# Patient Record
Sex: FEMALE | Race: WHITE | NOT HISPANIC OR LATINO | Employment: OTHER | URBAN - METROPOLITAN AREA
[De-identification: names, ages, dates, MRNs, and addresses within clinical notes are randomized per-mention and may not be internally consistent; named-entity substitution may affect disease eponyms.]

---

## 2023-02-19 PROBLEM — H93.11 TINNITUS OF RIGHT EAR: Status: ACTIVE | Noted: 2023-02-19

## 2023-02-19 PROBLEM — R09.82 POSTNASAL DRIP: Status: ACTIVE | Noted: 2023-02-19

## 2023-02-19 PROBLEM — J45.909 ASTHMA (HHS-HCC): Status: ACTIVE | Noted: 2023-02-19

## 2023-02-19 PROBLEM — E55.9 VITAMIN D DEFICIENCY: Status: ACTIVE | Noted: 2023-02-19

## 2023-02-19 PROBLEM — R53.83 OTHER FATIGUE: Status: ACTIVE | Noted: 2023-02-19

## 2023-02-19 PROBLEM — H69.91 DYSFUNCTION OF RIGHT EUSTACHIAN TUBE: Status: ACTIVE | Noted: 2023-02-19

## 2023-02-19 PROBLEM — M99.08 SOMATIC DYSFUNCTION OF RIB: Status: ACTIVE | Noted: 2023-02-19

## 2023-02-19 PROBLEM — M54.9 BACK PAIN: Status: ACTIVE | Noted: 2023-02-19

## 2023-02-19 PROBLEM — B85.0 HEAD LICE INFESTATION: Status: ACTIVE | Noted: 2023-02-19

## 2023-02-19 PROBLEM — H92.01 OTALGIA OF RIGHT EAR: Status: ACTIVE | Noted: 2023-02-19

## 2023-02-19 PROBLEM — H90.A31 MIXED CONDUCTIVE AND SENSORINEURAL HEARING LOSS, UNILATERAL, RIGHT EAR WITH RESTRICTED HEARING ON THE CONTRALATERAL SIDE: Status: ACTIVE | Noted: 2023-02-19

## 2023-02-19 PROBLEM — J01.90 ACUTE SINUSITIS: Status: ACTIVE | Noted: 2023-02-19

## 2023-02-19 PROBLEM — E78.5 HYPERLIPIDEMIA: Status: ACTIVE | Noted: 2023-02-19

## 2023-02-19 PROBLEM — H90.3 SENSORINEURAL HEARING LOSS OF BOTH EARS: Status: ACTIVE | Noted: 2023-02-19

## 2023-02-19 PROBLEM — R05.9 COUGH: Status: ACTIVE | Noted: 2023-02-19

## 2023-02-19 PROBLEM — J20.9 ACUTE BRONCHITIS: Status: ACTIVE | Noted: 2023-02-19

## 2023-02-19 PROBLEM — M54.6 THORACIC BACK PAIN: Status: ACTIVE | Noted: 2023-02-19

## 2023-02-19 PROBLEM — J06.9 VIRAL URI WITH COUGH: Status: ACTIVE | Noted: 2023-02-19

## 2023-02-19 PROBLEM — E66.3 OVERWEIGHT WITH BODY MASS INDEX (BMI) OF 29 TO 29.9 IN ADULT: Status: ACTIVE | Noted: 2023-02-19

## 2023-02-19 PROBLEM — J45.901 ACUTE ASTHMA EXACERBATION (HHS-HCC): Status: ACTIVE | Noted: 2023-02-19

## 2023-02-19 PROBLEM — J30.2 SEASONAL ALLERGIES: Status: ACTIVE | Noted: 2023-02-19

## 2023-02-19 PROBLEM — R91.1 NODULE OF LEFT LUNG: Status: ACTIVE | Noted: 2023-02-19

## 2023-02-19 PROBLEM — J30.2 SEASONAL ALLERGIC RHINITIS: Status: ACTIVE | Noted: 2023-02-19

## 2023-02-19 PROBLEM — M81.0 OSTEOPOROSIS, SENILE: Status: ACTIVE | Noted: 2023-02-19

## 2023-02-19 PROBLEM — R49.0 HOARSENESS OF VOICE: Status: ACTIVE | Noted: 2023-02-19

## 2023-02-19 PROBLEM — R06.00 DYSPNEA: Status: ACTIVE | Noted: 2023-02-19

## 2023-02-19 PROBLEM — M99.02 SEGMENTAL AND SOMATIC DYSFUNCTION OF THORACIC REGION: Status: ACTIVE | Noted: 2023-02-19

## 2023-02-19 PROBLEM — H91.90 HEARING IMPAIRMENT: Status: ACTIVE | Noted: 2023-02-19

## 2023-02-19 RX ORDER — IBUPROFEN 600 MG/1
600 TABLET ORAL 3 TIMES DAILY
COMMUNITY
End: 2023-06-22 | Stop reason: ALTCHOICE

## 2023-02-19 RX ORDER — LYSINE HCL 500 MG
1 TABLET ORAL 2 TIMES DAILY
COMMUNITY
Start: 2017-06-07

## 2023-02-19 RX ORDER — ALBUTEROL SULFATE 1.25 MG/3ML
1.25 SOLUTION RESPIRATORY (INHALATION)
COMMUNITY
Start: 2018-07-05 | End: 2023-03-13 | Stop reason: SDUPTHER

## 2023-02-19 RX ORDER — CYCLOBENZAPRINE HCL 5 MG
5 TABLET ORAL NIGHTLY
COMMUNITY
End: 2023-06-22 | Stop reason: ALTCHOICE

## 2023-02-19 RX ORDER — CHOLECALCIFEROL (VITAMIN D3) 1250 MCG
50000 TABLET ORAL
COMMUNITY
Start: 2017-06-07 | End: 2023-03-13 | Stop reason: ALTCHOICE

## 2023-03-13 ENCOUNTER — OFFICE VISIT (OUTPATIENT)
Dept: PRIMARY CARE | Facility: CLINIC | Age: 73
End: 2023-03-13
Payer: MEDICARE

## 2023-03-13 VITALS
HEIGHT: 63 IN | BODY MASS INDEX: 31.36 KG/M2 | HEART RATE: 99 BPM | SYSTOLIC BLOOD PRESSURE: 117 MMHG | OXYGEN SATURATION: 94 % | DIASTOLIC BLOOD PRESSURE: 76 MMHG | WEIGHT: 177 LBS

## 2023-03-13 DIAGNOSIS — J44.9 CHRONIC OBSTRUCTIVE PULMONARY DISEASE, UNSPECIFIED COPD TYPE (MULTI): ICD-10-CM

## 2023-03-13 DIAGNOSIS — J45.909 ASTHMA, UNSPECIFIED ASTHMA SEVERITY, UNSPECIFIED WHETHER COMPLICATED, UNSPECIFIED WHETHER PERSISTENT (HHS-HCC): ICD-10-CM

## 2023-03-13 DIAGNOSIS — Z09 HOSPITAL DISCHARGE FOLLOW-UP: Primary | ICD-10-CM

## 2023-03-13 DIAGNOSIS — R06.09 DYSPNEA ON EXERTION: ICD-10-CM

## 2023-03-13 DIAGNOSIS — R05.9 COUGH, UNSPECIFIED TYPE: ICD-10-CM

## 2023-03-13 PROCEDURE — 1036F TOBACCO NON-USER: CPT | Performed by: FAMILY MEDICINE

## 2023-03-13 PROCEDURE — 1159F MED LIST DOCD IN RCRD: CPT | Performed by: FAMILY MEDICINE

## 2023-03-13 PROCEDURE — 99214 OFFICE O/P EST MOD 30 MIN: CPT | Performed by: FAMILY MEDICINE

## 2023-03-13 RX ORDER — FLUTICASONE PROPIONATE AND SALMETEROL 250; 50 UG/1; UG/1
POWDER RESPIRATORY (INHALATION)
Qty: 60 EACH | Refills: 11 | Status: SHIPPED | OUTPATIENT
Start: 2023-03-13 | End: 2023-04-20 | Stop reason: SINTOL

## 2023-03-13 RX ORDER — FLUTICASONE PROPIONATE AND SALMETEROL 100; 50 UG/1; UG/1
1 POWDER RESPIRATORY (INHALATION) 2 TIMES DAILY
COMMUNITY
End: 2023-03-13

## 2023-03-13 RX ORDER — GUAIFENESIN 600 MG/1
TABLET, EXTENDED RELEASE ORAL EVERY 12 HOURS PRN
COMMUNITY
End: 2023-06-22 | Stop reason: ALTCHOICE

## 2023-03-13 RX ORDER — ACETAMINOPHEN 500 MG
TABLET ORAL DAILY
COMMUNITY

## 2023-03-13 RX ORDER — ALBUTEROL SULFATE 0.83 MG/ML
3 SOLUTION RESPIRATORY (INHALATION) 4 TIMES DAILY
COMMUNITY
Start: 2023-02-08 | End: 2023-06-22 | Stop reason: SDUPTHER

## 2023-03-13 RX ORDER — MONTELUKAST SODIUM 10 MG/1
10 TABLET ORAL NIGHTLY
COMMUNITY
Start: 2023-02-08 | End: 2023-03-13 | Stop reason: SDUPTHER

## 2023-03-13 RX ORDER — MONTELUKAST SODIUM 10 MG/1
10 TABLET ORAL NIGHTLY
Qty: 90 TABLET | Refills: 2 | Status: SHIPPED | OUTPATIENT
Start: 2023-03-13 | End: 2023-03-14 | Stop reason: SDUPTHER

## 2023-03-13 ASSESSMENT — PATIENT HEALTH QUESTIONNAIRE - PHQ9
2. FEELING DOWN, DEPRESSED OR HOPELESS: NOT AT ALL
1. LITTLE INTEREST OR PLEASURE IN DOING THINGS: NOT AT ALL
SUM OF ALL RESPONSES TO PHQ9 QUESTIONS 1 & 2: 0

## 2023-03-13 ASSESSMENT — ENCOUNTER SYMPTOMS
WHEEZING: 1
ORTHOPNEA: 1
COUGH: 1
SHORTNESS OF BREATH: 1
SPUTUM PRODUCTION: 1

## 2023-03-13 NOTE — PATIENT INSTRUCTIONS
Increase nebulizer to inhale three times daily. Please call if you need refills  Start higher dose of Advair.   You may continue mucinex  Complete Pulmonary Function tests after you begin to feel better  See Pulmonologist

## 2023-03-13 NOTE — PROGRESS NOTES
"Subjective   Patient ID: Margaret Argueta is a 72 y.o. female who presents for Hospital Follow-up (pneumonia).    Shortness of Breath  This is a recurrent problem. The current episode started more than 1 month ago. The problem occurs daily. The problem has been waxing and waning. Associated symptoms include orthopnea, sputum production and wheezing. The symptoms are aggravated by lying flat and any activity. Associated symptoms comments: Yellow sputum  . She has tried steroid inhalers, oral steroids, OTC cough suppressants and body position changes for the symptoms. The treatment provided mild relief. Her past medical history is significant for asthma and pneumonia.        Review of Systems   Respiratory:  Positive for cough, sputum production, shortness of breath and wheezing.         Wheezing while laying flat   Cardiovascular:  Positive for orthopnea.   All other systems reviewed and are negative.      Objective   /76   Pulse 99   Ht 1.6 m (5' 3\")   Wt 80.3 kg (177 lb)   SpO2 94%   BMI 31.35 kg/m²     Physical Exam  Vitals reviewed.   Constitutional:       Appearance: Normal appearance.   HENT:      Head: Normocephalic.      Right Ear: Tympanic membrane, ear canal and external ear normal.      Left Ear: Tympanic membrane, ear canal and external ear normal.      Nose: Nose normal.      Mouth/Throat:      Mouth: Mucous membranes are moist.      Pharynx: Oropharynx is clear.   Eyes:      Conjunctiva/sclera: Conjunctivae normal.   Cardiovascular:      Rate and Rhythm: Normal rate and regular rhythm.      Pulses: Normal pulses.   Pulmonary:      Breath sounds: Rales present.      Comments: Coarse rales throughout  Skin:     General: Skin is dry.   Neurological:      General: No focal deficit present.      Mental Status: She is alert and oriented to person, place, and time.           Assessment/Plan   Diagnoses and all orders for this visit:  Hospital discharge follow-up  Chronic obstructive pulmonary " disease, unspecified COPD type (CMS/[facility])  -     fluticasone propion-salmeteroL (Advair Diskus) 250-50 mcg/dose diskus inhaler; Inhalel 1 puff twice a day.  -     Increase albuterol nebulizer to four times daily. Will switch to duoneb if ineffective   -     Referral to Pulmonology; Future  -     Pulmonary function test; Future  Asthma, unspecified asthma severity, unspecified whether complicated, unspecified whether persistent  -     montelukast (Singulair) 10 mg tablet; Take 1 tablet (10 mg) by mouth once daily at bedtime.  -     fluticasone propion-salmeteroL (Advair Diskus) 250-50 mcg/dose diskus inhaler; Inhalel 1 puff twice a day. Rinse mouth with water after use to reduce aftertaste and incidence of candidiasis. Do not swallow.  -     Referral to Pulmonology; Future  -     Pulmonary function test; Future  Dyspnea on exertion  Cough, unspecified type  -continue mucinex     Signed by DISHA Peterson student

## 2023-03-14 ENCOUNTER — TELEPHONE (OUTPATIENT)
Dept: PRIMARY CARE | Facility: CLINIC | Age: 73
End: 2023-03-14
Payer: MEDICARE

## 2023-03-14 DIAGNOSIS — J45.909 ASTHMA, UNSPECIFIED ASTHMA SEVERITY, UNSPECIFIED WHETHER COMPLICATED, UNSPECIFIED WHETHER PERSISTENT (HHS-HCC): ICD-10-CM

## 2023-03-14 RX ORDER — MONTELUKAST SODIUM 10 MG/1
10 TABLET ORAL NIGHTLY
Qty: 90 TABLET | Refills: 2 | Status: SHIPPED | OUTPATIENT
Start: 2023-03-14 | End: 2023-03-17 | Stop reason: SDUPTHER

## 2023-03-14 NOTE — TELEPHONE ENCOUNTER
[facility] is calling in regards to the Prescription you sent over yesterday for Montelukast 10 MG. They said you sent over the generic version and put a LUIS on it they wanted to see if this was a mistake. She said you put do not substitute on the medication wanted to make sure that this wasn't a mistake.

## 2023-03-14 NOTE — PROGRESS NOTES
Attestation signed by Nathen Shields DO at 3/14/2023  1:24 PM     I saw and evaluated the patient, participating in the key portions of the service.  I reviewed the resident’s note.  I agree with the Nurse practitioner student's findings and plan.

## 2023-03-17 RX ORDER — MONTELUKAST SODIUM 10 MG/1
10 TABLET ORAL NIGHTLY
Qty: 90 TABLET | Refills: 2 | Status: SHIPPED | OUTPATIENT
Start: 2023-03-17 | End: 2023-06-22 | Stop reason: SDUPTHER

## 2023-04-14 ENCOUNTER — TELEPHONE (OUTPATIENT)
Dept: PRIMARY CARE | Facility: CLINIC | Age: 73
End: 2023-04-14
Payer: MEDICARE

## 2023-04-14 DIAGNOSIS — J45.40 MODERATE PERSISTENT ASTHMA WITHOUT COMPLICATION (HHS-HCC): Primary | ICD-10-CM

## 2023-04-14 NOTE — TELEPHONE ENCOUNTER
Pt is vinnie she states that she had her pulmonary function test last week and the tech who preformed the test, told her that she should ask you to change her advair inhaler to symbicort inhaler, since the advair is causing her voice to be raspy. Pt uses [phone]

## 2023-04-18 ENCOUNTER — HOSPITAL ENCOUNTER (OUTPATIENT)
Dept: DATA CONVERSION | Facility: HOSPITAL | Age: 73
End: 2023-04-18
Attending: ORTHOPAEDIC SURGERY | Admitting: ORTHOPAEDIC SURGERY
Payer: MEDICARE

## 2023-04-18 DIAGNOSIS — S61.356A: ICD-10-CM

## 2023-04-18 DIAGNOSIS — J45.909 UNSPECIFIED ASTHMA, UNCOMPLICATED (HHS-HCC): ICD-10-CM

## 2023-04-18 DIAGNOSIS — Z90.49 ACQUIRED ABSENCE OF OTHER SPECIFIED PARTS OF DIGESTIVE TRACT: ICD-10-CM

## 2023-04-18 DIAGNOSIS — Z87.891 PERSONAL HISTORY OF NICOTINE DEPENDENCE: ICD-10-CM

## 2023-04-18 DIAGNOSIS — Z88.0 ALLERGY STATUS TO PENICILLIN: ICD-10-CM
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
## 2023-04-20 RX ORDER — BUDESONIDE AND FORMOTEROL FUMARATE DIHYDRATE 160; 4.5 UG/1; UG/1
2 AEROSOL RESPIRATORY (INHALATION)
Qty: 1 EACH | Refills: 2 | Status: SHIPPED | OUTPATIENT
Start: 2023-04-20 | End: 2023-06-22

## 2023-06-12 ENCOUNTER — DOCUMENTATION (OUTPATIENT)
Dept: PRIMARY CARE | Facility: CLINIC | Age: 73
End: 2023-06-12
Payer: MEDICARE

## 2023-06-12 ENCOUNTER — PATIENT OUTREACH (OUTPATIENT)
Dept: CARE COORDINATION | Facility: CLINIC | Age: 73
End: 2023-06-12
Payer: MEDICARE

## 2023-06-12 NOTE — PROGRESS NOTES
Discharge Facility:  [facility]   Discharge Diagnosis: Acute hypoxic respiratory failure, asthma exacerbation   Admission Date:  6/7/23   Discharge Date: 6/10/23     PCP Appointment Date:  6/22/23   Specialist Appointment Date:   Hospital Encounter and Summary:  LINK     2 ATTEMPTS TO REACH PT WITH NO RETURN CALL     START taking these medications   benzonatate (TESSALON PERLE) 100 mg Take 100 mg by mouth three times daily.   DULERA 1 Puff Inhale 1 Puff as instructed twice daily.   fluticasone (FLONASE) 1 Spray Use 1 Spray in each nostril once daily.   CONTINUE these medications which have CHANGED   predniSONE (DELTASONE) 20 mg tablet Take 2 tablets by mouth once daily for 3 days, THEN 1.5 tablets once daily for 3 days, THEN 1 tablet once daily for 3 days, THEN 0.5 tablets once daily for 3 days. Qty: 15 tablet Refills: 0

## 2023-06-21 PROBLEM — E87.20 LACTIC ACIDOSIS: Status: ACTIVE | Noted: 2023-06-09

## 2023-06-21 PROBLEM — E78.5 DYSLIPIDEMIA: Status: ACTIVE | Noted: 2023-06-07

## 2023-06-22 ENCOUNTER — OFFICE VISIT (OUTPATIENT)
Dept: PRIMARY CARE | Facility: CLINIC | Age: 73
End: 2023-06-22
Payer: MEDICARE

## 2023-06-22 VITALS
HEIGHT: 65 IN | HEART RATE: 73 BPM | BODY MASS INDEX: 30.16 KG/M2 | OXYGEN SATURATION: 95 % | DIASTOLIC BLOOD PRESSURE: 71 MMHG | WEIGHT: 181 LBS | SYSTOLIC BLOOD PRESSURE: 109 MMHG

## 2023-06-22 DIAGNOSIS — M81.0 AGE-RELATED OSTEOPOROSIS WITHOUT CURRENT PATHOLOGICAL FRACTURE: Primary | ICD-10-CM

## 2023-06-22 DIAGNOSIS — E55.9 VITAMIN D DEFICIENCY: ICD-10-CM

## 2023-06-22 DIAGNOSIS — J30.2 SEASONAL ALLERGIES: ICD-10-CM

## 2023-06-22 DIAGNOSIS — Z00.00 ENCOUNTER FOR HEALTH MAINTENANCE EXAMINATION: Primary | ICD-10-CM

## 2023-06-22 DIAGNOSIS — E78.5 DYSLIPIDEMIA: ICD-10-CM

## 2023-06-22 DIAGNOSIS — J45.909 ASTHMA, UNSPECIFIED ASTHMA SEVERITY, UNSPECIFIED WHETHER COMPLICATED, UNSPECIFIED WHETHER PERSISTENT (HHS-HCC): ICD-10-CM

## 2023-06-22 DIAGNOSIS — Z00.00 MEDICARE ANNUAL WELLNESS VISIT, SUBSEQUENT: ICD-10-CM

## 2023-06-22 PROBLEM — E66.3 OVERWEIGHT WITH BODY MASS INDEX (BMI) OF 29 TO 29.9 IN ADULT: Status: RESOLVED | Noted: 2023-02-19 | Resolved: 2023-06-22

## 2023-06-22 PROCEDURE — 1160F RVW MEDS BY RX/DR IN RCRD: CPT | Performed by: FAMILY MEDICINE

## 2023-06-22 PROCEDURE — 1170F FXNL STATUS ASSESSED: CPT | Performed by: FAMILY MEDICINE

## 2023-06-22 PROCEDURE — 1159F MED LIST DOCD IN RCRD: CPT | Performed by: FAMILY MEDICINE

## 2023-06-22 PROCEDURE — 99397 PER PM REEVAL EST PAT 65+ YR: CPT | Performed by: FAMILY MEDICINE

## 2023-06-22 PROCEDURE — G0439 PPPS, SUBSEQ VISIT: HCPCS | Performed by: FAMILY MEDICINE

## 2023-06-22 PROCEDURE — G0446 INTENS BEHAVE THER CARDIO DX: HCPCS | Performed by: FAMILY MEDICINE

## 2023-06-22 PROCEDURE — 99497 ADVNCD CARE PLAN 30 MIN: CPT | Performed by: FAMILY MEDICINE

## 2023-06-22 PROCEDURE — 1036F TOBACCO NON-USER: CPT | Performed by: FAMILY MEDICINE

## 2023-06-22 RX ORDER — MOMETASONE FUROATE AND FORMOTEROL FUMARATE DIHYDRATE 100; 5 UG/1; UG/1
2 AEROSOL RESPIRATORY (INHALATION)
COMMUNITY
End: 2023-12-28 | Stop reason: SDUPTHER

## 2023-06-22 RX ORDER — CETIRIZINE HYDROCHLORIDE 10 MG/1
10 TABLET ORAL DAILY
COMMUNITY
End: 2023-06-22 | Stop reason: SDUPTHER

## 2023-06-22 RX ORDER — MULTIVITAMIN
1 TABLET ORAL DAILY
COMMUNITY

## 2023-06-22 RX ORDER — ROSUVASTATIN CALCIUM 10 MG/1
10 TABLET, COATED ORAL DAILY
Qty: 90 TABLET | Refills: 2 | Status: SHIPPED | OUTPATIENT
Start: 2023-06-22 | End: 2023-12-28 | Stop reason: SDUPTHER

## 2023-06-22 RX ORDER — ALBUTEROL SULFATE 0.83 MG/ML
3 SOLUTION RESPIRATORY (INHALATION) EVERY 6 HOURS PRN
Qty: 75 ML | Refills: 2 | Status: SHIPPED | OUTPATIENT
Start: 2023-06-22 | End: 2023-12-28 | Stop reason: SDUPTHER

## 2023-06-22 RX ORDER — CETIRIZINE HYDROCHLORIDE 10 MG/1
10 TABLET ORAL DAILY
Qty: 90 TABLET | Refills: 2 | Status: SHIPPED | OUTPATIENT
Start: 2023-06-22 | End: 2024-05-24 | Stop reason: SDUPTHER

## 2023-06-22 RX ORDER — ROSUVASTATIN CALCIUM 10 MG/1
10 TABLET, COATED ORAL DAILY
COMMUNITY
End: 2023-06-22 | Stop reason: SDUPTHER

## 2023-06-22 RX ORDER — ALBUTEROL SULFATE 90 UG/1
2 AEROSOL, METERED RESPIRATORY (INHALATION) EVERY 4 HOURS PRN
Qty: 18 G | Refills: 5 | Status: SHIPPED | OUTPATIENT
Start: 2023-06-22 | End: 2023-12-28 | Stop reason: SDUPTHER

## 2023-06-22 RX ORDER — MONTELUKAST SODIUM 10 MG/1
10 TABLET ORAL NIGHTLY
Qty: 90 TABLET | Refills: 2 | Status: SHIPPED | OUTPATIENT
Start: 2023-06-22 | End: 2023-12-28 | Stop reason: SDUPTHER

## 2023-06-22 ASSESSMENT — ACTIVITIES OF DAILY LIVING (ADL)
MANAGING_FINANCES: INDEPENDENT
TAKING_MEDICATION: INDEPENDENT
DRESSING: INDEPENDENT
BATHING: INDEPENDENT
DOING_HOUSEWORK: INDEPENDENT
GROCERY_SHOPPING: INDEPENDENT

## 2023-06-22 NOTE — PROGRESS NOTES
"Subjective   Reason for Visit: Margaret Argueta is an 72 y.o. female here for a Medicare Wellness visit.          Reviewed all medications by prescribing practitioner or clinical pharmacist (such as prescriptions, OTCs, herbal therapies and supplements) and documented in the medical record.    HPI    Patient Care Team:  Nathen Shields DO as PCP - General  Nathen Shields DO as PCP - [facility]ributed [facility]  Klaudia Nayak RN as Care Manager (Case Management)     Review of Systems    Objective   Vitals:  /71   Pulse 73   Ht 1.638 m (5' 4.5\")   Wt 82.1 kg (181 lb)   SpO2 95%   BMI 30.59 kg/m²       Physical Exam    Assessment/Plan   Problem List Items Addressed This Visit       Vitamin D deficiency    Relevant Orders    Vitamin D 1,25 Dihydroxy     Other Visit Diagnoses       Routine general medical examination at health care facility    -  Primary    Encounter for health maintenance examination        Relevant Orders    Lipid Panel    TSH with reflex to Free T4 if abnormal    Vitamin D 1,25 Dihydroxy    Urinalysis with Reflex Microscopic    Fatigue, unspecified type        Relevant Orders    TSH with reflex to Free T4 if abnormal    Screening, lipid        Relevant Orders    Lipid Panel               "

## 2023-06-23 NOTE — PROGRESS NOTES
Annual Comprehensive Medical Exam    72 y.o. female presents for annual comprehensive medical evaluation, annual Medicare Wellness Visit and preventive services screening.  No recent surgeries or significant injuries.    HPI  Hospitalized 6/7/23 at [facility] for acute asthma exacerbation.  Breathing is much improved today but still with some SOB and hoarse voice.  Dulera 100/5 being used twice daily but is expensive.  Rescue inhaler used several times this week.  Symbicort was cost-prohibited.    Hospitalized in 2/2023 and 12/2022 for asthma exacerbation.   Not using ICS/LABA routinely due to cost.    HLD - compliant with Crestor daily.  Denies side effects    Colon cancer screening - no record  Breast cancer screening - mammogram in 7/2022  Immunizations are up to date    Past Medical History:   Diagnosis Date    Personal history of other diseases of the circulatory system 12/28/2017    History of rheumatic fever    Personal history of other malignant neoplasm of skin     History of malignant neoplasm of skin      Past Surgical History:   Procedure Laterality Date    OTHER SURGICAL HISTORY  06/20/2022    Cholecystectomy laparoscopic     Family History   Problem Relation Name Age of Onset    Hypertension Mother      Emphysema Father          Lung    Breast cancer Sister      Lung cancer Brother      Other (Oral cancer) Brother        Social History     Socioeconomic History    Marital status: Single     Spouse name: Not on file    Number of children: Not on file    Years of education: Not on file    Highest education level: Not on file   Occupational History    Not on file   Tobacco Use    Smoking status: Never    Smokeless tobacco: Never   Substance and Sexual Activity    Alcohol use: Not on file    Drug use: Not on file    Sexual activity: Not on file   Other Topics Concern    Not on file   Social History Narrative    Not on file     Social Determinants of Health     Financial Resource Strain: Not on file   Food  Insecurity: Not on file   Transportation Needs: Not on file   Physical Activity: Not on file   Stress: Not on file   Social Connections: Not on file   Intimate Partner Violence: Not on file   Housing Stability: Not on file       Current Outpatient Medications on File Prior to Visit   Medication Sig Dispense Refill    calcium carbonate-vit D3-min 600 mg calcium- 400 unit tablet Take 1 tablet by mouth in the morning and 1 tablet before bedtime. Take with food..      cholecalciferol (Vitamin D3) 50 mcg (2,000 unit) capsule Take by mouth once daily.      mometasone-formoterol (Dulera) 100-5 mcg/actuation inhaler Inhale 2 puffs 2 times a day. Rinse mouth with water after use to reduce aftertaste and incidence of candidiasis. Do not swallow.      multivitamin tablet Take 1 tablet by mouth once daily.      [DISCONTINUED] albuterol 2.5 mg /3 mL (0.083 %) nebulizer solution Inhale 3 mL 4 times a day. Inhale 3 times daily      [DISCONTINUED] cetirizine (ZyrTEC) 10 mg tablet Take 1 tablet (10 mg) by mouth once daily.      [DISCONTINUED] montelukast (Singulair) 10 mg tablet Take 1 tablet (10 mg) by mouth once daily at bedtime. 90 tablet 2    [DISCONTINUED] rosuvastatin (Crestor) 10 mg tablet Take 1 tablet (10 mg) by mouth once daily.      [DISCONTINUED] budesonide-formoteroL (Symbicort) 160-4.5 mcg/actuation inhaler Inhale 2 puffs  in the morning and 2 puffs in the evening. Rinse mouth with water after use to reduce aftertaste and incidence of candidiasis. Do not swallow.. (Patient not taking: Reported on 6/22/2023) 1 each 2    [DISCONTINUED] cyclobenzaprine (Flexeril) 5 mg tablet Take 1 tablet (5 mg) by mouth once daily at bedtime.      [DISCONTINUED] guaiFENesin (Mucinex) 600 mg 12 hr tablet Take by mouth every 12 hours if needed.      [DISCONTINUED] ibuprofen 600 mg tablet Take 1 tablet (600 mg) by mouth in the morning and 1 tablet (600 mg) in the evening and 1 tablet (600 mg) before bedtime.       No current  "facility-administered medications on file prior to visit.       Allergies   Allergen Reactions    Codeine Anaphylaxis    Oxycodone Unknown    Penicillins Unknown    Vancomycin Unknown       Complete review of systems is negative today except for that mentioned in the history of present illness.  In particular patient denies chest pain, shortness of breath, headaches and GI disturbances.      Visit Vitals  /71   Pulse 73   Ht 1.638 m (5' 4.5\")   Wt 82.1 kg (181 lb)   SpO2 95%   BMI 30.59 kg/m²   Smoking Status Never   BSA 1.93 m²      Physical Exam  Gen.: Alert and oriented ×3 female in no acute distress.  HEENT: Head is normocephalic.  Pupils equal and reactive to light.  Tympanic membranes are clear.  Pharynx is clear.  Neck is supple without adenopathy or carotid bruits.  No masses or thyromegaly  Heart: Regular rate and rhythm without murmurs.  Lungs: Clear to auscultation bilaterally.  Breasts: deferred to GYN at pt request.  Pelvic: Deferred to GYN at pt request.  Abdomen: Soft with normal bowel sounds.  No masses or pain to palpation.  No bruits auscultated.  Extremities: Good range of motion of all joints.  No significant edema. Pedal pulses +1-2/4  Skin: No significant or irregular nevi visualized.  Neuro: No signs of focal neurologic deficit.  No tremor.  Speech and hearing are normal.  DTRs +3/4;  Muscle Strength +5/5.  Musculoskeletal: Spine with good ROM.  No scoliosis.  Leg lengths are equal.  Psych: normal affect.  No suicidal ideation.  Good judgement and insight.     The 10-year ASCVD risk score (Barbara DK, et al., 2019) is: 9.9%    Values used to calculate the score:      Age: 72 years      Sex: Female      Is Non- : No      Diabetic: No      Tobacco smoker: No      Systolic Blood Pressure: 109 mmHg      Is BP treated: No      HDL Cholesterol: 54 mg/dL      Total Cholesterol: 221 mg/dL        Diagnosis/Plan  1. Encounter for health maintenance examination  - Lipid " Panel; Future  - TSH with reflex to Free T4 if abnormal; Future  - Vitamin D 1,25 Dihydroxy; Future  - Urinalysis with Reflex Microscopic; Future    2. Medicare annual wellness visit, subsequent  Living Will / Advanced Care Planning:  Discussed advance care planning including explanation and discussion of advanced directives.  Patient does have current up-to-date documents.       3. Asthma, unspecified asthma severity, unspecified whether complicated, unspecified whether persistent  - recommend annual Influenza vaccination and continued use of ICS/LABA inhaler  - montelukast (Singulair) 10 mg tablet; Take 1 tablet (10 mg) by mouth once daily at bedtime.  Dispense: 90 tablet; Refill: 2  - albuterol 2.5 mg /3 mL (0.083 %) nebulizer solution; Take 3 mL by nebulization every 6 hours if needed for wheezing. Inhale 3 times daily  Dispense: 75 mL; Refill: 2  - albuterol (ProAir HFA) 90 mcg/actuation inhaler; Inhale 2 puffs every 4 hours if needed for wheezing or shortness of breath.  Dispense: 18 g; Refill: 5    4. Seasonal allergies  - cetirizine (ZyrTEC) 10 mg tablet; Take 1 tablet (10 mg) by mouth once daily.  Dispense: 90 tablet; Refill: 2    5. Vitamin D deficiency  - Vitamin D 1,25 Dihydroxy; Future    6. Dyslipidemia  Hyperlipidemia:  Patient to continue medication.  Emphasize diet and exercise.  Explained importance of risk factor modification.  Emphasized importance of compliance to decrease risk for heart attack, stroke and peripheral artery disease.    - rosuvastatin (Crestor) 10 mg tablet; Take 1 tablet (10 mg) by mouth once daily.  Dispense: 90 tablet; Refill: 2        Follow up in 6 months for medical manageent    I will continue to monitor, evaluate, assess and treat all problems/diagnoses as appropriate and continue to collaborate with specialists.    Contact office or send a [employer] message with any questions or concerns    Encouraged to sign up with [employer]  Patient will only be notified of labs  that require medical intervention.    Prescriptions will not be filled unless you are compliant with your follow up appointments or have a follow up appointment scheduled as per instruction of your physician. Refills should be requested at the time of your visit.    **Charting was completed using voice recognition technology and may include unintended errors**    Nathen Shields DO, FACOFP  Senior Attending Physician  [facility]  [address]  [place] [address] [phone]          Nathen Shields DO, FACOFP

## 2023-07-13 ENCOUNTER — PATIENT OUTREACH (OUTPATIENT)
Dept: CARE COORDINATION | Facility: CLINIC | Age: 73
End: 2023-07-13
Payer: MEDICARE

## 2023-08-23 ENCOUNTER — PATIENT OUTREACH (OUTPATIENT)
Dept: CARE COORDINATION | Facility: CLINIC | Age: 73
End: 2023-08-23
Payer: MEDICARE

## 2023-09-07 VITALS — BODY MASS INDEX: 31.25 KG/M2 | WEIGHT: 176.37 LBS | HEIGHT: 63 IN

## 2023-10-02 NOTE — OP NOTE
PROCEDURE DETAILS    Preoperative Diagnosis:  Open bite of right little finger with damage to nail, S61.356A    Postoperative Diagnosis:  Right right finger down to bone with soft tissue defect  Surgeon: Sue Salazar  Resident/Fellow/Other Assistant: Cindi Barboza    Procedure:  Right small finger irrigation debridement including skin fascia to level of bone  Right complex wound Lausier with V-Y plasty local tissue advancement, 3 cm  Anesthesia: General anesthesia  Estimated Blood Loss: 0  Findings: Wound down to level of bone  Specimens(s) Collected: no,     Complications: None  IV Fluids: 200 cc  Tourniquet Times: Utilized  Patient Returned To/Condition: Stable        Operative Report:   Clinical note:    Patient is taken the operating room for right fifth finger wound sustained in a dog bite.  The patient has a loss of skin measuring about 3 cm x 1 cm in the area distally.  It goes away from 1 cuticle of the other right up to the nail.  There is a defect  there that seems to.  To go down to the level of bone on the radial side.  The plan today is to proceed with a washout and advancement of the skin closure with a full-thickness skin graft or V-Y plasty in all likelihood.  The patient understands Intra-Op  decision was made.  She understands a nonoperative option.  Patient is consented and marked.    Operative note:    Patient taken the op room and anesthesia was ministered the extremities prepped and draped usual manner.  A final timeout is done with the operative  team.  Esmarch exsanguination is done and the tourniquet is inflated.  Dissection is carried over the area some granulation tissue was removed mechanical debridement is done with a scalpel and tenotomy down to level of bone.  The area is then washed out  with irrigation.  Once that was done it was elected to do a advancement versus a full-thickness skin graft to get coverage over the bone.  Therefore V-Y plasty was then performed to the  DIP joint crease the skin was easily advanced to get coverage over  the entire area.  This was done with 5-0 nylon.  The skin was reapproximated at the V-Y plasty junction proximally.  This got full coverage over the defect.  The wound was irrigated further at the skin level.  A little Marcaine was infiltrated the base  of the finger for postoperative pain relief.  Dressings include Xeroform fluffs web roll and a soft dressing with plaster over the finger to maintain  no tension over the area.  Patient tolerated procedure well without complication.                        Attestation:   Note Completion:  Attending Attestation I performed the procedure without a resident         Electronic Signatures:  Sue Salazar)  (Signed 18-Apr-2023 11:36)   Authored: Post-Operative Note, Chart Review, Note Completion      Last Updated: 18-Apr-2023 11:36 by Sue Salazar)

## 2023-10-06 ENCOUNTER — HOSPITAL ENCOUNTER (OUTPATIENT)
Dept: CARDIOLOGY | Facility: CLINIC | Age: 73
Discharge: HOME | End: 2023-10-06
Payer: MEDICARE

## 2023-10-06 VITALS — HEIGHT: 64 IN | BODY MASS INDEX: 30.9 KG/M2 | WEIGHT: 181 LBS

## 2023-10-06 DIAGNOSIS — R06.09 OTHER FORMS OF DYSPNEA: ICD-10-CM

## 2023-10-06 DIAGNOSIS — R06.00 DYSPNEA, UNSPECIFIED: ICD-10-CM

## 2023-10-06 LAB — EJECTION FRACTION APICAL 4 CHAMBER: 63.1

## 2023-10-06 PROCEDURE — 93306 TTE W/DOPPLER COMPLETE: CPT | Performed by: INTERNAL MEDICINE

## 2023-10-06 PROCEDURE — 93306 TTE W/DOPPLER COMPLETE: CPT

## 2023-12-07 ENCOUNTER — OFFICE VISIT (OUTPATIENT)
Dept: PULMONOLOGY | Facility: HOSPITAL | Age: 73
End: 2023-12-07
Payer: MEDICARE

## 2023-12-07 VITALS
BODY MASS INDEX: 31.24 KG/M2 | DIASTOLIC BLOOD PRESSURE: 74 MMHG | SYSTOLIC BLOOD PRESSURE: 116 MMHG | TEMPERATURE: 97.2 F | OXYGEN SATURATION: 96 % | HEART RATE: 79 BPM | WEIGHT: 182 LBS

## 2023-12-07 DIAGNOSIS — R09.82 POSTNASAL DRIP: ICD-10-CM

## 2023-12-07 DIAGNOSIS — R05.9 COUGH, UNSPECIFIED TYPE: Primary | ICD-10-CM

## 2023-12-07 DIAGNOSIS — R91.1 NODULE OF LEFT LUNG: ICD-10-CM

## 2023-12-07 DIAGNOSIS — J45.909 ASTHMA, UNSPECIFIED ASTHMA SEVERITY, UNSPECIFIED WHETHER COMPLICATED, UNSPECIFIED WHETHER PERSISTENT (HHS-HCC): ICD-10-CM

## 2023-12-07 PROCEDURE — 99214 OFFICE O/P EST MOD 30 MIN: CPT | Performed by: NURSE PRACTITIONER

## 2023-12-07 PROCEDURE — 1160F RVW MEDS BY RX/DR IN RCRD: CPT | Performed by: NURSE PRACTITIONER

## 2023-12-07 PROCEDURE — 1126F AMNT PAIN NOTED NONE PRSNT: CPT | Performed by: NURSE PRACTITIONER

## 2023-12-07 PROCEDURE — 1159F MED LIST DOCD IN RCRD: CPT | Performed by: NURSE PRACTITIONER

## 2023-12-07 PROCEDURE — 1036F TOBACCO NON-USER: CPT | Performed by: NURSE PRACTITIONER

## 2023-12-07 RX ORDER — CLARITHROMYCIN 500 MG/1
500 TABLET, FILM COATED ORAL 2 TIMES DAILY
Qty: 10 TABLET | Refills: 0 | Status: SHIPPED | OUTPATIENT
Start: 2023-12-07 | End: 2023-12-12

## 2023-12-07 SDOH — ECONOMIC STABILITY: FOOD INSECURITY: WITHIN THE PAST 12 MONTHS, YOU WORRIED THAT YOUR FOOD WOULD RUN OUT BEFORE YOU GOT MONEY TO BUY MORE.: NEVER TRUE

## 2023-12-07 SDOH — ECONOMIC STABILITY: FOOD INSECURITY: WITHIN THE PAST 12 MONTHS, THE FOOD YOU BOUGHT JUST DIDN'T LAST AND YOU DIDN'T HAVE MONEY TO GET MORE.: NEVER TRUE

## 2023-12-07 ASSESSMENT — ENCOUNTER SYMPTOMS
PALPITATIONS: 0
MYALGIAS: 0
NUMBNESS: 0
FEVER: 0
EYE PAIN: 0
HEADACHES: 1
FATIGUE: 0
JOINT SWELLING: 0
VOICE CHANGE: 1
VOMITING: 0
DIARRHEA: 0
DIZZINESS: 0
AGITATION: 0
SINUS PRESSURE: 0
ARTHRALGIAS: 1
ABDOMINAL PAIN: 0
WEAKNESS: 0
RHINORRHEA: 1
NAUSEA: 0
BACK PAIN: 0
NERVOUS/ANXIOUS: 0

## 2023-12-07 ASSESSMENT — PATIENT HEALTH QUESTIONNAIRE - PHQ9
SUM OF ALL RESPONSES TO PHQ9 QUESTIONS 1 & 2: 0
2. FEELING DOWN, DEPRESSED OR HOPELESS: NOT AT ALL
1. LITTLE INTEREST OR PLEASURE IN DOING THINGS: NOT AT ALL

## 2023-12-07 ASSESSMENT — PAIN SCALES - GENERAL: PAINLEVEL: 0-NO PAIN

## 2023-12-07 ASSESSMENT — LIFESTYLE VARIABLES: HOW OFTEN DO YOU HAVE A DRINK CONTAINING ALCOHOL: MONTHLY OR LESS

## 2023-12-07 NOTE — PATIENT INSTRUCTIONS
1. Asthma/ dyspnea on exertion: PFTs without obstruction. MICU hospitalization in 6/2023 at [facility]. Given hoarse voice.   - continue dulera 100 2 puffs twice daily - rinse mouth out afterwards   - continue albuterol HFA inhaler 2 puffs or albuterol nebulizer treatment every 4-6 hours as needed for shortness of breath   - will get sputum specimens   - will do course of clarithromycin      2. Pulmonary nodules: seen on CT from 2018- given dyspnea and this will get CT chest to further evaluate. Nodules stable from 2018 - few small nodules, but given low risk no further follow up needed.   - no further follow up needed      3. Allergic rhinitis:   - continue montelukast (singulair) 10mg daily at bedtime   - continue cetirizine (zyrtec 10mg daily   - continue fluticasone (flonase) 1 spray each nostril 1-2 x per day - remember to aim towards your ear   - start nasal saline 2-3 x per day       4. Hoarseness:   - will continue to monitor     Thank you for visiting the Pulmonary clinic today!   Return to clinic  3 months or sooner if needed   Eliz Birmingham CNP  My office number is [phone]  Yulissa is my  and Freya is my nurse.   Radiology scheduling [phone]   Appointment scheduling [phone]

## 2023-12-07 NOTE — PROGRESS NOTES
Patient: Margaret Argueta    [id number]  : 1950 -- AGE 73 y.o.    Provider: ISABELA Hernandez     Location [facility]   Service Date: 2023              [facility]  Follow up visit note      HISTORY OF PRESENT ILLNESS       HISTORY OF PRESENT ILLNESS     Ms. Argueta is a 73 year old CF (remote light smoker) here for follow up of asthma and dyspnea on exertion. Last seen in clinic on 23.     We switched from dulera to airduo (with a coupon last visit).  She was not able to get the Airduo. She kept feeling worse and then ended up taking an antibiotic (her grandsons left over). She states she felt better after this. She states it was clarithromycin - not complete course. She states this helped with her congestion in her chest and her head. She states she was frustrated and tried it and was surprised how much it helped her. She states it was two weeks ago. She states she felt 100% for 2 weeks and then over the last week or so it started creeping back.  Over the last few days she has had some congestion and SOB. She states her voice hoarseness went away with the antibiotic, but its starting up again.  She has been taking montelukast, cetrizine, and flonase  daily. She restarted the dulera a few days ago -- is using it once daily. She is not using the nebulizer at all. She was coughing up green mucous. She states the antibiotic cleared the mucous completely. She coughed up a little today -- not enough to see it. She denies any wheezing, SOB at rest, GERD, or CP. She has some ORTA with going up the stairs.     CAT today 10   ACT today 20     23: She states since 10/2022 she has had issues with her breathing. She was diagnosed with asthma in her 50s, but never needed any medications. She feels when she was a kid she couldn't do as much/ keep up with other kids. She is not sure if she has had it her whole life and it was never  diagnosed. She has been using her dulera twice daily. She was in the ED in 10/2022, 2/2023 (asthma/ pneumonia), and 6/6023. The hospitalized in 6/2023 at [facility] she was in MICU with BIPAP. She has PRN albuterol HFA - hardly ever. She has a albuterol nebulizers - states she was told twice daily. She was started on the dulera after the hospitalization in 6/2023. She feels her breathing has been under better control since she started on the dulera. She has a cough - can be productive with yellowish sputum. She feels its more so mucous from her throat as opposed to her lungs. She has ORTA with going up/down the stairs. She will at times notice some SOB at rest. She only notices wheezing if her symptoms are out of control. She takes montelukast, zyrtec, and flonase daily. She denies any runny nose/ nasal congestion. She does feel she has some post nasal drip. She has been on these allergy medication since June. She feels she has more mucous in her throat since she started on these. She has had a raspy voice that she is not sure if it is related to her dulera. She denies any GERD or CP. She states the raspy voice only started after she started the dulera.   ACT today 17     Previous pulmonary history: She was previously told she has asthma.     Inhalers/nebulized medications: dulera and albuterol      Hospitalization History: She has been hospitalized over the last year for breathing related problem - several times.      Sleep history: She has been told she snored - new since her voice changed/ started the dulera. She most of the time wakes up feeling rested.     ALLERGIES AND MEDICATIONS     ALLERGIES  Allergies   Allergen Reactions    Codeine Anaphylaxis    Oxycodone Unknown    Penicillins Unknown    Vancomycin Unknown       MEDICATIONS  Current Outpatient Medications   Medication Sig Dispense Refill    albuterol (ProAir HFA) 90 mcg/actuation inhaler Inhale 2 puffs every 4 hours if needed for wheezing or shortness of  breath. 18 g 5    albuterol 2.5 mg /3 mL (0.083 %) nebulizer solution Take 3 mL by nebulization every 6 hours if needed for wheezing. Inhale 3 times daily 75 mL 2    calcium carbonate-vit D3-min 600 mg calcium- 400 unit tablet Take 1 tablet by mouth in the morning and 1 tablet before bedtime. Take with food..      cetirizine (ZyrTEC) 10 mg tablet Take 1 tablet (10 mg) by mouth once daily. 90 tablet 2    cholecalciferol (Vitamin D3) 50 mcg (2,000 unit) capsule Take by mouth once daily.      mometasone-formoterol (Dulera) 100-5 mcg/actuation inhaler Inhale 2 puffs 2 times a day. Rinse mouth with water after use to reduce aftertaste and incidence of candidiasis. Do not swallow.      montelukast (Singulair) 10 mg tablet Take 1 tablet (10 mg) by mouth once daily at bedtime. 90 tablet 2    multivitamin tablet Take 1 tablet by mouth once daily.      rosuvastatin (Crestor) 10 mg tablet Take 1 tablet (10 mg) by mouth once daily. 90 tablet 2     No current facility-administered medications for this visit.         PAST HISTORY     PAST MEDICAL HISTORY  Comorbidities:  - HLD   - asthma   - cholecystectomy 2021   - rheumatic fever - 3 - had issues with heart since - needs premedication for procedures     PAST SURGICAL HISTORY  Past Surgical History:   Procedure Laterality Date    OTHER SURGICAL HISTORY  2022    Cholecystectomy laparoscopic       IMMUNIZATION HISTORY  Immunization History   Administered Date(s) Administered    Moderna SARS-CoV-2 Vaccination 03/15/2021, 2021, 2021, 2022, 2022    Pneumococcal conjugate vaccine, 20-valent (PREVNAR 20) 2022    Pneumococcal polysaccharide vaccine, 23-valent, age 2 years and older (PNEUMOVAX 23) 2015    Tdap vaccine, age 7 year and older (BOOSTRIX) 04/10/2023       SOCIAL HISTORY  smokin-22 - few cigarettes per day   drinkin [employer] a month   illicit drug use: none  Dog/ Cat      OCCUPATIONAL/ENVIRONMENTAL HISTORY  Occupation:  Retired - She was a  for phone books.     FAMILY HISTORY  Family History: Grandkids with asthma. Sister and father with emphysema (smoked). Brother -  of lung cancer (smoked)       RESULTS/DATA     Pulmonary Function Test Results       PFTs: 23 - FEV1/FVC 0.82/ FEV1 2.01 (98%)/ FVC 2.49 (92%)/ TLC 5.24 (109%)/ DLCO 91%        Chest Radiograph     CHEST 2 VIEW   Impression  Small amount of linear atelectasis or scarring right upper lobe and  base. No focal consolidation.      Chest CT Scan     CT chest: 18 --5 MM TECHNICALLY INDETERMINATE NONCALCIFIED SUBPLEURAL LEFT LOWER LOBE NODULE IS THE ABNORMALITY FIRST DESCRIBED ON THE ONLY OTHER CHEST IMAGING AVAILABLE, TWO VIEWS FROM 2018. NO OTHER NODULES. THE LUNGS ARE NOT OVERLY EMPHYSEMATOUS. THERE IS NO THORACIC ADENOPATHY.    23: IMPRESSION:  1. Previously described 5 mm left lower lobe nodule is stable. Few  new pulmonary nodules measuring up to 3 mm in the bilateral lungs as  described above.  If patient has risk factors for lung cancer, a  follow-up chest CT in 1 year is considered optional per 2017  Fleischner Society guidelines. Otherwise, in the absence of risk  factors, no routine follow-up is indicated.  2. No acute cardiopulmonary process.  3. Nondisplaced fractures bilateral posterior 7th ribs that are also  noted on the previous chest radiograph from 2023.       Echocardiogram     10/6/23: . Left ventricular systolic function is normal with a 60-65% estimated ejection fraction.   2. Poorly visualized anatomical structures due to suboptimal image quality.   3. Spectral Doppler shows an impaired relaxation pattern of left ventricular diastolic filling.   RA normal size, RV normal size and function     Labs/ Other testing        REVIEW OF SYSTEMS     REVIEW OF SYSTEMS  Review of Systems   Constitutional:  Negative for fatigue and fever.   HENT:  Positive for congestion, rhinorrhea and voice change. Negative for  postnasal drip and sinus pressure.    Eyes:  Negative for pain and visual disturbance.   Cardiovascular:  Negative for chest pain, palpitations and leg swelling.   Gastrointestinal:  Negative for abdominal pain, diarrhea, nausea and vomiting.   Endocrine: Negative for cold intolerance and heat intolerance.   Musculoskeletal:  Positive for arthralgias. Negative for back pain, joint swelling and myalgias.   Skin:  Negative for rash.   Neurological:  Positive for headaches. Negative for dizziness, weakness and numbness.   Psychiatric/Behavioral:  Negative for agitation. The patient is not nervous/anxious.          PHYSICAL EXAM     VITAL SIGNS: Temp 36.2 °C (97.2 °F)   Wt 82.6 kg (182 lb)   SpO2 96% Comment: RA  BMI 31.24 kg/m²      CURRENT WEIGHT: [unfilled]  BMI: [unfilled]  PREVIOUS WEIGHTS:  Wt Readings from Last 3 Encounters:   12/07/23 82.6 kg (182 lb)   10/06/23 82.1 kg (181 lb)   06/22/23 82.1 kg (181 lb)       Physical Exam  Vitals reviewed.   Constitutional:       General: She is not in acute distress.     Appearance: Normal appearance. She is not ill-appearing or toxic-appearing.   HENT:      Head: Normocephalic.      Nose: No rhinorrhea.   Cardiovascular:      Rate and Rhythm: Normal rate and regular rhythm.      Heart sounds: Normal heart sounds.   Pulmonary:      Effort: Pulmonary effort is normal. No respiratory distress.      Breath sounds: Normal breath sounds. No stridor.   Abdominal:      General: Abdomen is flat.   Musculoskeletal:         General: No swelling. Normal range of motion.   Skin:     General: Skin is warm and dry.      Nails: There is no clubbing.   Neurological:      General: No focal deficit present.      Mental Status: She is alert.   Psychiatric:         Mood and Affect: Mood normal.         Behavior: Behavior normal.         Judgment: Judgment normal.         ASSESSMENT/PLAN     1. Asthma/ dyspnea on exertion: PFTs without obstruction. MICU hospitalization in 6/2023 at [facility]. Given  hoarse voice.   - continue dulera 100 2 puffs twice daily - rinse mouth out afterwards   - continue albuterol HFA inhaler 2 puffs or albuterol nebulizer treatment every 4-6 hours as needed for shortness of breath   - will get sputum specimens - unclear if possible atypical infection vs it assisting with sinus issues/ post nasal drip   - will do course of clarithromycin      2. Pulmonary nodules: seen on CT from 2018- given dyspnea and this will get CT chest to further evaluate. Nodules stable from 2018 - few small nodules, but given low risk no further follow up needed.   - no further follow up needed      3. Allergic rhinitis:   - continue montelukast (singulair) 10mg daily at bedtime   - continue cetirizine (zyrtec 10mg daily   - continue fluticasone (flonase) 1 spray each nostril 1-2 x per day - remember to aim towards your ear   - start nasal saline 2-3 x per day       4. Hoarseness:   - will continue to monitor

## 2023-12-28 ENCOUNTER — OFFICE VISIT (OUTPATIENT)
Dept: PRIMARY CARE | Facility: CLINIC | Age: 73
End: 2023-12-28
Payer: MEDICARE

## 2023-12-28 ENCOUNTER — LAB (OUTPATIENT)
Dept: LAB | Facility: LAB | Age: 73
End: 2023-12-28
Payer: MEDICARE

## 2023-12-28 VITALS
HEART RATE: 84 BPM | OXYGEN SATURATION: 97 % | WEIGHT: 179 LBS | DIASTOLIC BLOOD PRESSURE: 70 MMHG | HEIGHT: 64 IN | SYSTOLIC BLOOD PRESSURE: 112 MMHG | BODY MASS INDEX: 30.56 KG/M2

## 2023-12-28 DIAGNOSIS — J45.909 ASTHMA, UNSPECIFIED ASTHMA SEVERITY, UNSPECIFIED WHETHER COMPLICATED, UNSPECIFIED WHETHER PERSISTENT (HHS-HCC): ICD-10-CM

## 2023-12-28 DIAGNOSIS — E66.09 CLASS 1 OBESITY DUE TO EXCESS CALORIES WITH SERIOUS COMORBIDITY AND BODY MASS INDEX (BMI) OF 30.0 TO 30.9 IN ADULT: ICD-10-CM

## 2023-12-28 DIAGNOSIS — Z00.00 ENCOUNTER FOR HEALTH MAINTENANCE EXAMINATION: ICD-10-CM

## 2023-12-28 DIAGNOSIS — E78.5 DYSLIPIDEMIA: ICD-10-CM

## 2023-12-28 DIAGNOSIS — J45.909 ASTHMA, UNSPECIFIED ASTHMA SEVERITY, UNSPECIFIED WHETHER COMPLICATED, UNSPECIFIED WHETHER PERSISTENT (HHS-HCC): Primary | ICD-10-CM

## 2023-12-28 DIAGNOSIS — E55.9 VITAMIN D DEFICIENCY: ICD-10-CM

## 2023-12-28 DIAGNOSIS — Z91.09 ENVIRONMENTAL ALLERGIES: ICD-10-CM

## 2023-12-28 PROBLEM — E66.811 CLASS 1 OBESITY DUE TO EXCESS CALORIES WITH SERIOUS COMORBIDITY AND BODY MASS INDEX (BMI) OF 30.0 TO 30.9 IN ADULT: Status: ACTIVE | Noted: 2023-12-28

## 2023-12-28 LAB
ALBUMIN SERPL BCP-MCNC: 4.1 G/DL (ref 3.4–5)
ALP SERPL-CCNC: 78 U/L (ref 33–136)
ALT SERPL W P-5'-P-CCNC: 20 U/L (ref 7–45)
ANION GAP SERPL CALC-SCNC: 11 MMOL/L (ref 10–20)
APPEARANCE UR: ABNORMAL
AST SERPL W P-5'-P-CCNC: 20 U/L (ref 9–39)
BACTERIA #/AREA URNS AUTO: ABNORMAL /HPF
BILIRUB SERPL-MCNC: 0.6 MG/DL (ref 0–1.2)
BILIRUB UR STRIP.AUTO-MCNC: NEGATIVE MG/DL
BUN SERPL-MCNC: 13 MG/DL (ref 6–23)
CALCIUM SERPL-MCNC: 9.3 MG/DL (ref 8.6–10.3)
CHLORIDE SERPL-SCNC: 105 MMOL/L (ref 98–107)
CHOLEST SERPL-MCNC: 177 MG/DL (ref 0–199)
CHOLESTEROL/HDL RATIO: 3.1
CO2 SERPL-SCNC: 28 MMOL/L (ref 21–32)
COLOR UR: YELLOW
CREAT SERPL-MCNC: 0.7 MG/DL (ref 0.5–1.05)
GFR SERPL CREATININE-BSD FRML MDRD: >90 ML/MIN/1.73M*2
GLUCOSE SERPL-MCNC: 108 MG/DL (ref 74–99)
GLUCOSE UR STRIP.AUTO-MCNC: NEGATIVE MG/DL
HDLC SERPL-MCNC: 56.4 MG/DL
KETONES UR STRIP.AUTO-MCNC: NEGATIVE MG/DL
LDLC SERPL CALC-MCNC: 92 MG/DL
LEUKOCYTE ESTERASE UR QL STRIP.AUTO: ABNORMAL
MUCOUS THREADS #/AREA URNS AUTO: ABNORMAL /LPF
NITRITE UR QL STRIP.AUTO: NEGATIVE
NON HDL CHOLESTEROL: 121 MG/DL (ref 0–149)
PH UR STRIP.AUTO: 6 [PH]
POTASSIUM SERPL-SCNC: 4.3 MMOL/L (ref 3.5–5.3)
PROT SERPL-MCNC: 6.8 G/DL (ref 6.4–8.2)
PROT UR STRIP.AUTO-MCNC: NEGATIVE MG/DL
RBC # UR STRIP.AUTO: ABNORMAL /UL
RBC #/AREA URNS AUTO: ABNORMAL /HPF
SODIUM SERPL-SCNC: 140 MMOL/L (ref 136–145)
SP GR UR STRIP.AUTO: 1.02
SQUAMOUS #/AREA URNS AUTO: ABNORMAL /HPF
TRIGL SERPL-MCNC: 144 MG/DL (ref 0–149)
TSH SERPL-ACNC: 3.73 MIU/L (ref 0.44–3.98)
UROBILINOGEN UR STRIP.AUTO-MCNC: <2 MG/DL
VLDL: 29 MG/DL (ref 0–40)
WBC #/AREA URNS AUTO: ABNORMAL /HPF

## 2023-12-28 PROCEDURE — 1160F RVW MEDS BY RX/DR IN RCRD: CPT | Performed by: FAMILY MEDICINE

## 2023-12-28 PROCEDURE — 82652 VIT D 1 25-DIHYDROXY: CPT

## 2023-12-28 PROCEDURE — 80061 LIPID PANEL: CPT

## 2023-12-28 PROCEDURE — 80053 COMPREHEN METABOLIC PANEL: CPT

## 2023-12-28 PROCEDURE — 1126F AMNT PAIN NOTED NONE PRSNT: CPT | Performed by: FAMILY MEDICINE

## 2023-12-28 PROCEDURE — 1159F MED LIST DOCD IN RCRD: CPT | Performed by: FAMILY MEDICINE

## 2023-12-28 PROCEDURE — 3008F BODY MASS INDEX DOCD: CPT | Performed by: FAMILY MEDICINE

## 2023-12-28 PROCEDURE — 1036F TOBACCO NON-USER: CPT | Performed by: FAMILY MEDICINE

## 2023-12-28 PROCEDURE — 84443 ASSAY THYROID STIM HORMONE: CPT

## 2023-12-28 PROCEDURE — 36415 COLL VENOUS BLD VENIPUNCTURE: CPT

## 2023-12-28 PROCEDURE — 99214 OFFICE O/P EST MOD 30 MIN: CPT | Performed by: FAMILY MEDICINE

## 2023-12-28 PROCEDURE — 81001 URINALYSIS AUTO W/SCOPE: CPT

## 2023-12-28 RX ORDER — FLUTICASONE PROPIONATE 50 MCG
1 SPRAY, SUSPENSION (ML) NASAL DAILY
COMMUNITY
End: 2023-12-28 | Stop reason: SDUPTHER

## 2023-12-28 RX ORDER — ALBUTEROL SULFATE 0.83 MG/ML
3 SOLUTION RESPIRATORY (INHALATION) EVERY 6 HOURS PRN
Qty: 75 ML | Refills: 2 | Status: SHIPPED | OUTPATIENT
Start: 2023-12-28

## 2023-12-28 RX ORDER — FLUTICASONE PROPIONATE 50 MCG
1 SPRAY, SUSPENSION (ML) NASAL DAILY
Qty: 16 G | Refills: 5 | Status: SHIPPED | OUTPATIENT
Start: 2023-12-28 | End: 2024-05-24 | Stop reason: WASHOUT

## 2023-12-28 RX ORDER — MONTELUKAST SODIUM 10 MG/1
10 TABLET ORAL NIGHTLY
Qty: 90 TABLET | Refills: 2 | Status: SHIPPED | OUTPATIENT
Start: 2023-12-28 | End: 2024-05-24 | Stop reason: SDUPTHER

## 2023-12-28 RX ORDER — ALBUTEROL SULFATE 90 UG/1
2 AEROSOL, METERED RESPIRATORY (INHALATION) EVERY 4 HOURS PRN
Qty: 18 G | Refills: 5 | Status: SHIPPED | OUTPATIENT
Start: 2023-12-28 | End: 2024-12-27

## 2023-12-28 RX ORDER — ROSUVASTATIN CALCIUM 10 MG/1
10 TABLET, COATED ORAL DAILY
Qty: 90 TABLET | Refills: 2 | Status: SHIPPED | OUTPATIENT
Start: 2023-12-28

## 2023-12-28 RX ORDER — MOMETASONE FUROATE AND FORMOTEROL FUMARATE DIHYDRATE 100; 5 UG/1; UG/1
2 AEROSOL RESPIRATORY (INHALATION)
Qty: 13 G | Refills: 5 | Status: SHIPPED | OUTPATIENT
Start: 2023-12-28 | End: 2024-05-24 | Stop reason: SDUPTHER

## 2023-12-28 NOTE — PROGRESS NOTES
General Medical Management Note    73 y.o. female presents for Medical Management  HPI    CT chest in September 2023 due to dyspnea on exertion and history of pulmonary nodules was essentially negative for any acute etiology.    Echocardiogram in September 2023 was also normal although some areas were not well-visualized.    Asthma: Nebulized albuterol once daily.  Dulera once daily.  Pulmonology NP eddi. has been feeling much better since initiating this regimen.  Last hospitalization in June 2023 for asthma exacerbation    Weight management:  healthy diet.  Not much exercise except walking a dog.    Last year had a significant illness requiring hospitalization: Pneumonia with asthma exacerbation.  Moved in with ex- and still there.    Has been admitted to the hospital or had ER evaluations several times for asthma exacerbations over the past year.  Twice in December 2022, February 2023 and June 2023.    Past Medical History:   Diagnosis Date    Personal history of other diseases of the circulatory system 12/28/2017    History of rheumatic fever    Personal history of other malignant neoplasm of skin     History of malignant neoplasm of skin      Past Surgical History:   Procedure Laterality Date    OTHER SURGICAL HISTORY  06/20/2022    Cholecystectomy laparoscopic     Family History   Problem Relation Name Age of Onset    Hypertension Mother      Emphysema Father          Lung    Breast cancer Sister      Lung cancer Brother      Other (Oral cancer) Brother        Social History     Socioeconomic History    Marital status: Single     Spouse name: Not on file    Number of children: Not on file    Years of education: Not on file    Highest education level: Not on file   Occupational History    Not on file   Tobacco Use    Smoking status: Never    Smokeless tobacco: Never   Substance and Sexual Activity    Alcohol use: Not on file     Comment: Socially    Drug use: Never    Sexual activity: Not on file   Other  Topics Concern    Not on file   Social History Narrative    Not on file     Social Determinants of Health     Financial Resource Strain: Not on file   Food Insecurity: No Food Insecurity (12/7/2023)    Hunger Vital Sign     Worried About Running Out of Food in the Last Year: Never true     Ran Out of Food in the Last Year: Never true   Transportation Needs: Not on file   Physical Activity: Not on file   Stress: Not on file   Social Connections: Not on file   Intimate Partner Violence: Not on file   Housing Stability: Not on file       Current Outpatient Medications on File Prior to Visit   Medication Sig Dispense Refill    albuterol (ProAir HFA) 90 mcg/actuation inhaler Inhale 2 puffs every 4 hours if needed for wheezing or shortness of breath. 18 g 5    albuterol 2.5 mg /3 mL (0.083 %) nebulizer solution Take 3 mL by nebulization every 6 hours if needed for wheezing. Inhale 3 times daily 75 mL 2    calcium carbonate-vit D3-min 600 mg calcium- 400 unit tablet Take 1 tablet by mouth in the morning and 1 tablet before bedtime. Take with food..      cetirizine (ZyrTEC) 10 mg tablet Take 1 tablet (10 mg) by mouth once daily. 90 tablet 2    cholecalciferol (Vitamin D3) 50 mcg (2,000 unit) capsule Take by mouth once daily.      fluticasone (Flonase) 50 mcg/actuation nasal spray Administer 1 spray into each nostril once daily. Shake gently. Before first use, prime pump. After use, clean tip and replace cap.      mometasone-formoterol (Dulera) 100-5 mcg/actuation inhaler Inhale 2 puffs 2 times a day. Rinse mouth with water after use to reduce aftertaste and incidence of candidiasis. Do not swallow.      montelukast (Singulair) 10 mg tablet Take 1 tablet (10 mg) by mouth once daily at bedtime. 90 tablet 2    multivitamin tablet Take 1 tablet by mouth once daily.      rosuvastatin (Crestor) 10 mg tablet Take 1 tablet (10 mg) by mouth once daily. 90 tablet 2     No current facility-administered medications on file prior to  "visit.       Allergies   Allergen Reactions    Codeine Anaphylaxis    Oxycodone Unknown    Penicillins Unknown    Vancomycin Unknown         ROS: Denies chest pain, SOB, Headache, GI problems     Visit Vitals  /70   Pulse 84   Ht 1.626 m (5' 4\")   Wt 81.2 kg (179 lb)   SpO2 97%   BMI 30.73 kg/m²   Smoking Status Never   BSA 1.92 m²        PHYSICAL EXAM:  Alert and oriented x3.  Eyes: EOM grossly intact  Neck supple without lymph adenopathy or carotid bruit.  No masses or thyromegaly  Heart regular rate and rhythm without murmur.  Lungs clear to auscultation.  Legs without edema.  Gait is non-antalgic  Speech clear.  Hearing adequate.          DIAGNOSIS/PLAN:    1. Asthma, unspecified asthma severity, unspecified whether complicated, unspecified whether persistent  - Comprehensive Metabolic Panel; Future  - albuterol 2.5 mg /3 mL (0.083 %) nebulizer solution; Take 3 mL by nebulization every 6 hours if needed for wheezing. Inhale 3 times daily  Dispense: 75 mL; Refill: 2  - albuterol (ProAir HFA) 90 mcg/actuation inhaler; Inhale 2 puffs every 4 hours if needed for wheezing or shortness of breath.  Dispense: 18 g; Refill: 5  - fluticasone (Flonase) 50 mcg/actuation nasal spray; Administer 1 spray into each nostril once daily. Shake gently. Before first use, prime pump. After use, clean tip and replace cap.  Dispense: 16 g; Refill: 5  - mometasone-formoterol (Dulera) 100-5 mcg/actuation inhaler; Inhale 2 puffs 2 times a day. Rinse mouth with water after use to reduce aftertaste and incidence of candidiasis. Do not swallow.  Dispense: 13 g; Refill: 5  - montelukast (Singulair) 10 mg tablet; Take 1 tablet (10 mg) by mouth once daily at bedtime.  Dispense: 90 tablet; Refill: 2    2. Dyslipidemia  - Comprehensive Metabolic Panel; Future  - rosuvastatin (Crestor) 10 mg tablet; Take 1 tablet (10 mg) by mouth once daily.  Dispense: 90 tablet; Refill: 2    3. Environmental allergies  - fluticasone (Flonase) 50 " mcg/actuation nasal spray; Administer 1 spray into each nostril once daily. Shake gently. Before first use, prime pump. After use, clean tip and replace cap.  Dispense: 16 g; Refill: 5  - mometasone-formoterol (Dulera) 100-5 mcg/actuation inhaler; Inhale 2 puffs 2 times a day. Rinse mouth with water after use to reduce aftertaste and incidence of candidiasis. Do not swallow.  Dispense: 13 g; Refill: 5    4. Class 1 obesity due to excess calories with serious comorbidity and body mass index (BMI) of 30.0 to 30.9 in adult  Patient encouraged to commit to a diet of lower carbohydrates and increase vegetable and fruit intake. Patient also encouraged to increase water intake to 80 ounces/day. Continue exercise for at least 30 minutes a day on most days of the week.  Sustained obesity leads to increased risk for multiple medical problems including heart attack, stroke, cancer and infection.  For more assistance and weight loss options, go to the website: Yourweightmatters.org.  Additional resources:  RethinkObesity.com, obesity.org, obesityaction.org, BetterTaykeyp.CurrencyBird      Return to office in 6 months for comprehensive medical evaluation, long-term medication use monitoring, and preventative services screening    We will continue to monitor, evaluate, assess and treat all problems/diagnoses as appropriate and continue to collaborate with specialists.    Encouraged to sign up with [facility]    Contact office or send a  Social [employer] message with any questions or concerns    Patient will only be notified of labs that require medical intervention.    Prescriptions will not be filled unless you are compliant with your follow up appointments or have a follow up appointment scheduled as per instruction of your physician. Refills should be requested at the time of your visit.    **Charting was completed using voice recognition technology and may include unintended errors**    Nathen Shields DO, FACOFP  [address], [id number]  [place]  [place] [address]  [phone]  Nathen Shields DO, FACOFP

## 2023-12-30 LAB — 1,25(OH)2D3 SERPL-MCNC: 50.9 PG/ML (ref 19.9–79.3)

## 2024-03-07 ENCOUNTER — APPOINTMENT (OUTPATIENT)
Dept: PULMONOLOGY | Facility: HOSPITAL | Age: 74
End: 2024-03-07
Payer: MEDICARE

## 2024-04-08 ENCOUNTER — APPOINTMENT (OUTPATIENT)
Dept: PULMONOLOGY | Facility: HOSPITAL | Age: 74
End: 2024-04-08
Payer: COMMERCIAL

## 2024-05-23 ENCOUNTER — OFFICE VISIT (OUTPATIENT)
Dept: URGENT CARE | Facility: CLINIC | Age: 74
End: 2024-05-23
Payer: COMMERCIAL

## 2024-05-23 VITALS
WEIGHT: 175 LBS | HEIGHT: 65 IN | SYSTOLIC BLOOD PRESSURE: 155 MMHG | HEART RATE: 79 BPM | BODY MASS INDEX: 29.16 KG/M2 | OXYGEN SATURATION: 94 % | TEMPERATURE: 97.4 F | DIASTOLIC BLOOD PRESSURE: 78 MMHG | RESPIRATION RATE: 14 BRPM

## 2024-05-23 DIAGNOSIS — J01.00 ACUTE NON-RECURRENT MAXILLARY SINUSITIS: Primary | ICD-10-CM

## 2024-05-23 PROCEDURE — 3008F BODY MASS INDEX DOCD: CPT

## 2024-05-23 PROCEDURE — 1036F TOBACCO NON-USER: CPT

## 2024-05-23 PROCEDURE — 1125F AMNT PAIN NOTED PAIN PRSNT: CPT

## 2024-05-23 PROCEDURE — 99203 OFFICE O/P NEW LOW 30 MIN: CPT

## 2024-05-23 PROCEDURE — 1159F MED LIST DOCD IN RCRD: CPT

## 2024-05-23 RX ORDER — DOXYCYCLINE 100 MG/1
100 CAPSULE ORAL 2 TIMES DAILY
Qty: 14 CAPSULE | Refills: 0 | Status: SHIPPED | OUTPATIENT
Start: 2024-05-23 | End: 2024-05-30

## 2024-05-23 ASSESSMENT — ENCOUNTER SYMPTOMS
NAUSEA: 0
TROUBLE SWALLOWING: 0
HEADACHES: 1
CHILLS: 0
COUGH: 1
WEAKNESS: 0
RHINORRHEA: 1
BRUISES/BLEEDS EASILY: 0
SINUS PRESSURE: 1
WHEEZING: 0
SORE THROAT: 0
DIARRHEA: 0
CHEST TIGHTNESS: 0
ABDOMINAL PAIN: 0
FEVER: 0
MYALGIAS: 0
VOMITING: 0
EYE ITCHING: 0
SHORTNESS OF BREATH: 0

## 2024-05-23 ASSESSMENT — PAIN SCALES - GENERAL: PAINLEVEL: 4

## 2024-05-23 NOTE — PROGRESS NOTES
Subjective   Patient ID: Margaret Argueta is a 73 y.o. female.    HPI    Patient presents to urgent care for complaints of sinus pressure, headache, postnasal drip, cough for the last week and a half.  Patient states that it started out as a common cold however it is since progressed.  Patient does have a history of asthma and has been using her Dulera inhaler as needed.  Patient denies any fevers, chills, shortness of breath, difficulty breathing, abdominal pain, nausea, vomiting, diarrhea.  Patient states that she has taken anything over-the-counter for congestion and cold with no relief of symptoms.    Review of Systems   Constitutional:  Negative for chills and fever.   HENT:  Positive for congestion, postnasal drip, rhinorrhea and sinus pressure. Negative for ear discharge, ear pain, sore throat and trouble swallowing.    Eyes:  Negative for itching.   Respiratory:  Positive for cough. Negative for chest tightness, shortness of breath and wheezing.    Cardiovascular:  Negative for chest pain.   Gastrointestinal:  Negative for abdominal pain, diarrhea, nausea and vomiting.   Musculoskeletal:  Negative for myalgias.   Neurological:  Positive for headaches. Negative for weakness.   Hematological:  Does not bruise/bleed easily.     Objective   Physical Exam  Constitutional:       Appearance: Normal appearance.   HENT:      Head: Normocephalic and atraumatic.      Right Ear: Tympanic membrane, ear canal and external ear normal.      Left Ear: Tympanic membrane, ear canal and external ear normal.      Nose: Congestion present.      Mouth/Throat:      Mouth: Mucous membranes are moist.      Pharynx: Oropharynx is clear. No posterior oropharyngeal erythema.   Eyes:      Extraocular Movements: Extraocular movements intact.      Conjunctiva/sclera: Conjunctivae normal.      Pupils: Pupils are equal, round, and reactive to light.   Cardiovascular:      Rate and Rhythm: Normal rate and regular rhythm.      Pulses: Normal  pulses.      Heart sounds: Normal heart sounds.   Pulmonary:      Effort: Pulmonary effort is normal.      Breath sounds: Normal breath sounds.   Abdominal:      General: Abdomen is flat. Bowel sounds are normal.      Palpations: Abdomen is soft.   Musculoskeletal:         General: Normal range of motion.      Cervical back: Normal range of motion and neck supple.   Skin:     General: Skin is warm and dry.      Capillary Refill: Capillary refill takes less than 2 seconds.   Neurological:      General: No focal deficit present.      Mental Status: She is alert and oriented to person, place, and time.           Discussed with the patient that we will start her on doxycycline twice a day for the next 7 days.  Informed her that if she has no improvement once she has completed with the antibiotic she is to follow-up with her PCP.  Discussed if she is to develop worsening shortness of breath, difficulty breathing, chest pain she is to proceed to the ER immediately for further evaluation.    Assessment/Plan   Problem List Items Addressed This Visit             ICD-10-CM    Acute sinusitis - Primary J01.90    Relevant Medications    doxycycline (Monodox) 100 mg capsule       Patient disposition: Home

## 2024-05-24 ENCOUNTER — TELEMEDICINE (OUTPATIENT)
Dept: PULMONOLOGY | Facility: HOSPITAL | Age: 74
End: 2024-05-24
Payer: COMMERCIAL

## 2024-05-24 ENCOUNTER — TELEPHONE (OUTPATIENT)
Dept: URGENT CARE | Facility: CLINIC | Age: 74
End: 2024-05-24
Payer: COMMERCIAL

## 2024-05-24 DIAGNOSIS — J30.2 SEASONAL ALLERGIES: ICD-10-CM

## 2024-05-24 DIAGNOSIS — J45.909 ASTHMA, UNSPECIFIED ASTHMA SEVERITY, UNSPECIFIED WHETHER COMPLICATED, UNSPECIFIED WHETHER PERSISTENT (HHS-HCC): ICD-10-CM

## 2024-05-24 DIAGNOSIS — J01.90 ACUTE SINUSITIS, RECURRENCE NOT SPECIFIED, UNSPECIFIED LOCATION: Primary | ICD-10-CM

## 2024-05-24 DIAGNOSIS — Z91.09 ENVIRONMENTAL ALLERGIES: ICD-10-CM

## 2024-05-24 PROCEDURE — 99212 OFFICE O/P EST SF 10 MIN: CPT | Performed by: NURSE PRACTITIONER

## 2024-05-24 PROCEDURE — 3008F BODY MASS INDEX DOCD: CPT | Performed by: NURSE PRACTITIONER

## 2024-05-24 PROCEDURE — 1159F MED LIST DOCD IN RCRD: CPT | Performed by: NURSE PRACTITIONER

## 2024-05-24 PROCEDURE — 99212 OFFICE O/P EST SF 10 MIN: CPT | Mod: 95 | Performed by: NURSE PRACTITIONER

## 2024-05-24 RX ORDER — CETIRIZINE HYDROCHLORIDE 10 MG/1
10 TABLET ORAL DAILY
Qty: 90 TABLET | Refills: 3 | Status: SHIPPED | OUTPATIENT
Start: 2024-05-24

## 2024-05-24 RX ORDER — MOMETASONE FUROATE AND FORMOTEROL FUMARATE DIHYDRATE 100; 5 UG/1; UG/1
2 AEROSOL RESPIRATORY (INHALATION)
Qty: 13 G | Refills: 5 | Status: SHIPPED | OUTPATIENT
Start: 2024-05-24

## 2024-05-24 RX ORDER — MONTELUKAST SODIUM 10 MG/1
10 TABLET ORAL NIGHTLY
Qty: 90 TABLET | Refills: 3 | Status: SHIPPED | OUTPATIENT
Start: 2024-05-24

## 2024-05-24 RX ORDER — AZITHROMYCIN 250 MG/1
TABLET, FILM COATED ORAL
Qty: 6 TABLET | Refills: 0 | Status: SHIPPED | OUTPATIENT
Start: 2024-05-24 | End: 2024-05-29

## 2024-05-24 ASSESSMENT — ENCOUNTER SYMPTOMS
DIARRHEA: 0
JOINT SWELLING: 0
PALPITATIONS: 0
NUMBNESS: 0
RHINORRHEA: 0
ABDOMINAL PAIN: 0
WEAKNESS: 0
MYALGIAS: 0
DIZZINESS: 0
SINUS PRESSURE: 0
EYE PAIN: 0
BACK PAIN: 0
HEADACHES: 0
NAUSEA: 0
VOMITING: 0
FATIGUE: 0
NERVOUS/ANXIOUS: 0
FEVER: 0
AGITATION: 0
ARTHRALGIAS: 0
VOICE CHANGE: 0

## 2024-05-24 NOTE — PROGRESS NOTES
" Patient: Margaret Argueta    [id number]  : 1950 -- AGE 73 y.o.    Provider: EVONNE Hernandez-CNP     Location [facility]   Service Date: 2024              [facility]  Follow up visit note      HISTORY OF PRESENT ILLNESS       HISTORY OF PRESENT ILLNESS     Virtual visit     Ms. Argueta is a 73 year old CF (remote light smoker) here for follow up of asthma and dyspnea on exertion. Last seen in clinic on 23.     Since last visit she was in the urgent care yesterday for a sinus infection and was sent home on a course of doxycycline.  She ended up getting a rash from it - they switched her to azithromycin. She feels it is effecting her breathing. She has the dulera - has been using PRN. She rarely needs her aalbuterol nd has ntot been needing the nebulizers. She has a productive cough with clear mucous. She has noticed some mucous in her throat - davin wheezing. She feels prior to this cold her ORTA was doing ok. Son states jaya does have some ORTA with going up the stairs.  She notices trouble when the air quality is bad - recently more rouble with this sinus infection. She denies any SOB at rest, CP, or GERD. She has been taking cetirizine daily,   She if she has some congestion - takes 1/2 mucinex and a sudafed - will do this am/ pm.  She takes her montelukast before bed.   Her PCP is aware she is taking sudafed twice a day.     23\" We switched from dulera to airduo (with a coupon last visit).  She was not able to get the Airduo. She kept feeling worse and then ended up taking an antibiotic (her grandsons left over). She states she felt better after this. She states it was clarithromycin - not complete course. She states this helped with her congestion in her chest and her head. She states she was frustrated and tried it and was surprised how much it helped her. She states it was two weeks ago. She states she felt 100% for 2 " weeks and then over the last week or so it started creeping back.  Over the last few days she has had some congestion and SOB. She states her voice hoarseness went away with the antibiotic, but its starting up again.  She has been taking montelukast, cetrizine, and flonase  daily. She restarted the dulera a few days ago -- is using it once daily. She is not using the nebulizer at all. She was coughing up green mucous. She states the antibiotic cleared the mucous completely. She coughed up a little today -- not enough to see it. She denies any wheezing, SOB at rest, GERD, or CP. She has some ORTA with going up the stairs.   CAT today 10   ACT today 20 8/31/23: She states since 10/2022 she has had issues with her breathing. She was diagnosed with asthma in her 50s, but never needed any medications. She feels when she was a kid she couldn't do as much/ keep up with other kids. She is not sure if she has had it her whole life and it was never diagnosed. She has been using her dulera twice daily. She was in the ED in 10/2022, 2/2023 (asthma/ pneumonia), and 6/6023. The hospitalized in 6/2023 at [facility] she was in MICU with BIPAP. She has PRN albuterol HFA - hardly ever. She has a albuterol nebulizers - states she was told twice daily. She was started on the dulera after the hospitalization in 6/2023. She feels her breathing has been under better control since she started on the dulera. She has a cough - can be productive with yellowish sputum. She feels its more so mucous from her throat as opposed to her lungs. She has ORTA with going up/down the stairs. She will at times notice some SOB at rest. She only notices wheezing if her symptoms are out of control. She takes montelukast, zyrtec, and flonase daily. She denies any runny nose/ nasal congestion. She does feel she has some post nasal drip. She has been on these allergy medication since June. She feels she has more mucous in her throat since she started on these.  She has had a raspy voice that she is not sure if it is related to her dulera. She denies any GERD or CP. She states the raspy voice only started after she started the dulera.   ACT today 17     Previous pulmonary history: She was previously told she has asthma.     Inhalers/nebulized medications: dulera and albuterol      Hospitalization History: She has been hospitalized over the last year for breathing related problem - several times.      Sleep history: She has been told she snored - new since her voice changed/ started the dulera. She most of the time wakes up feeling rested.     ALLERGIES AND MEDICATIONS     ALLERGIES  Allergies   Allergen Reactions    Codeine Anaphylaxis    Oxycodone Unknown    Penicillins Unknown    Vancomycin Unknown       MEDICATIONS  Current Outpatient Medications   Medication Sig Dispense Refill    albuterol (ProAir HFA) 90 mcg/actuation inhaler Inhale 2 puffs every 4 hours if needed for wheezing or shortness of breath. 18 g 5    albuterol 2.5 mg /3 mL (0.083 %) nebulizer solution Take 3 mL by nebulization every 6 hours if needed for wheezing. Inhale 3 times daily 75 mL 2    azithromycin (Zithromax Z-Cj) 250 mg tablet Take 2 tablets (500 mg) on  Day 1,  followed by 1 tablet (250 mg) once daily on Days 2 through 5. 6 tablet 0    calcium carbonate-vit D3-min 600 mg calcium- 400 unit tablet Take 1 tablet by mouth in the morning and 1 tablet before bedtime. Take with food..      cetirizine (ZyrTEC) 10 mg tablet Take 1 tablet (10 mg) by mouth once daily. 90 tablet 2    cholecalciferol (Vitamin D3) 50 mcg (2,000 unit) capsule Take by mouth once daily.      doxycycline (Monodox) 100 mg capsule Take 1 capsule (100 mg) by mouth 2 times a day for 7 days. Take with at least 8 ounces (large glass) of water, do not lie down for 30 minutes after 14 capsule 0    fluticasone (Flonase) 50 mcg/actuation nasal spray Administer 1 spray into each nostril once daily. Shake gently. Before first use, prime  pump. After use, clean tip and replace cap. 16 g 5    mometasone-formoterol (Dulera) 100-5 mcg/actuation inhaler Inhale 2 puffs 2 times a day. Rinse mouth with water after use to reduce aftertaste and incidence of candidiasis. Do not swallow. 13 g 5    montelukast (Singulair) 10 mg tablet Take 1 tablet (10 mg) by mouth once daily at bedtime. 90 tablet 2    multivitamin tablet Take 1 tablet by mouth once daily.      rosuvastatin (Crestor) 10 mg tablet Take 1 tablet (10 mg) by mouth once daily. 90 tablet 2     No current facility-administered medications for this visit.         PAST HISTORY     PAST MEDICAL HISTORY  Comorbidities:  - HLD   - asthma   - cholecystectomy 2021   - rheumatic fever - 4 - had issues with heart since - needs premedication for procedures     PAST SURGICAL HISTORY  Past Surgical History:   Procedure Laterality Date    OTHER SURGICAL HISTORY  2022    Cholecystectomy laparoscopic       IMMUNIZATION HISTORY  Immunization History   Administered Date(s) Administered    Flu vaccine, quadrivalent, high-dose, preservative free, age 65y+ (FLUZONE) 2023    Influenza, seasonal, injectable 2022    Moderna SARS-CoV-2 Vaccination 03/15/2021, 2021, 2021, 2022, 2022    Pneumococcal Conjugate PCV 7 1950    Pneumococcal conjugate vaccine, 20-valent (PREVNAR 20) 2022    Pneumococcal polysaccharide vaccine, 23-valent, age 2 years and older (PNEUMOVAX 23) 2015, 01/10/2022    Tdap vaccine, age 7 year and older (BOOSTRIX, ADACEL) 04/10/2023    Zoster vaccine, recombinant, adult (SHINGRIX) 2023, 2023       SOCIAL HISTORY  smokin-22 - few cigarettes per day   drinkin [employer] a month   illicit drug use: none  Dog/ Cat      OCCUPATIONAL/ENVIRONMENTAL HISTORY  Occupation: Retired - She was a  for phone books.     FAMILY HISTORY  Family History: Grandkids with asthma. Sister and father with emphysema (smoked). Brother -  of  lung cancer (smoked)       RESULTS/DATA     Pulmonary Function Test Results       PFTs: 4/4/23 - FEV1/FVC 0.82/ FEV1 2.01 (98%)/ FVC 2.49 (92%)/ TLC 5.24 (109%)/ DLCO 91%        Chest Radiograph     CHEST 2 VIEW   Impression  Small amount of linear atelectasis or scarring right upper lobe and  base. No focal consolidation.      Chest CT Scan     CT chest: 7/19/18 --5 MM TECHNICALLY INDETERMINATE NONCALCIFIED SUBPLEURAL LEFT LOWER LOBE NODULE IS THE ABNORMALITY FIRST DESCRIBED ON THE ONLY OTHER CHEST IMAGING AVAILABLE, TWO VIEWS FROM 5 JULY 2018. NO OTHER NODULES. THE LUNGS ARE NOT OVERLY EMPHYSEMATOUS. THERE IS NO THORACIC ADENOPATHY.    9/23/23: IMPRESSION:  1. Previously described 5 mm left lower lobe nodule is stable. Few  new pulmonary nodules measuring up to 3 mm in the bilateral lungs as  described above.  If patient has risk factors for lung cancer, a  follow-up chest CT in 1 year is considered optional per 2017  Fleischner Society guidelines. Otherwise, in the absence of risk  factors, no routine follow-up is indicated.  2. No acute cardiopulmonary process.  3. Nondisplaced fractures bilateral posterior 7th ribs that are also  noted on the previous chest radiograph from 02/24/2023.       Echocardiogram     10/6/23: . Left ventricular systolic function is normal with a 60-65% estimated ejection fraction.   2. Poorly visualized anatomical structures due to suboptimal image quality.   3. Spectral Doppler shows an impaired relaxation pattern of left ventricular diastolic filling.   RA normal size, RV normal size and function     Labs/ Other testing        REVIEW OF SYSTEMS     REVIEW OF SYSTEMS  Review of Systems   Constitutional:  Negative for fatigue and fever.   HENT:  Negative for congestion, postnasal drip, rhinorrhea, sinus pressure and voice change.    Eyes:  Negative for pain and visual disturbance.   Cardiovascular:  Negative for chest pain, palpitations and leg swelling.   Gastrointestinal:  Negative  for abdominal pain, diarrhea, nausea and vomiting.   Endocrine: Negative for cold intolerance and heat intolerance.   Musculoskeletal:  Negative for arthralgias, back pain, joint swelling and myalgias.   Skin:  Negative for rash.   Neurological:  Negative for dizziness, weakness, numbness and headaches.   Psychiatric/Behavioral:  Negative for agitation. The patient is not nervous/anxious.          PHYSICAL EXAM     VITAL SIGNS: There were no vitals taken for this visit.     CURRENT WEIGHT: [unfilled]  BMI: [unfilled]  PREVIOUS WEIGHTS:  Wt Readings from Last 3 Encounters:   05/23/24 79.4 kg (175 lb)   12/28/23 81.2 kg (179 lb)   12/07/23 82.6 kg (182 lb)       Physical Exam- virtual visit     ASSESSMENT/PLAN     1. Asthma/ dyspnea on exertion: PFTs without obstruction. MICU hospitalization in 6/2023 at [facility]. Given hoarse voice.   - continue dulera 100 2 puffs twice daily - rinse mouth out afterwards   - continue albuterol HFA inhaler 2 puffs or albuterol nebulizer treatment every 4-6 hours as needed for shortness of breath   - finish azithromycin course from urgent care      2. Pulmonary nodules: seen on CT from 2018- given dyspnea and this will get CT chest to further evaluate. Nodules stable from 2018 - few small nodules, but given low risk no further follow up needed.   - no further follow up needed      3. Allergic rhinitis:   - continue montelukast (singulair) 10mg daily at bedtime   - discussed sudafed today - PCP is aware she is taking it and mucinex twice daily       Thank you for visiting the Pulmonary clinic today!   Return to clinic 6 months  or sooner if needed   Eliz Birmingham CNP  My office -  [phone]- Freya is my nurse.   Radiology scheduling [phone]   Appointment scheduling [phone]   Pulmonary function testing - [phone]      Spoke with the patient on live audio/video chat for 10 minutes.

## 2024-06-24 ENCOUNTER — APPOINTMENT (OUTPATIENT)
Dept: PRIMARY CARE | Facility: CLINIC | Age: 74
End: 2024-06-24
Payer: COMMERCIAL

## 2024-07-29 ENCOUNTER — APPOINTMENT (OUTPATIENT)
Dept: PRIMARY CARE | Facility: CLINIC | Age: 74
End: 2024-07-29
Payer: COMMERCIAL

## 2024-07-29 VITALS
DIASTOLIC BLOOD PRESSURE: 76 MMHG | SYSTOLIC BLOOD PRESSURE: 114 MMHG | OXYGEN SATURATION: 96 % | HEIGHT: 64 IN | BODY MASS INDEX: 30.73 KG/M2 | WEIGHT: 180 LBS | HEART RATE: 93 BPM

## 2024-07-29 DIAGNOSIS — E66.09 CLASS 1 OBESITY DUE TO EXCESS CALORIES WITH SERIOUS COMORBIDITY AND BODY MASS INDEX (BMI) OF 30.0 TO 30.9 IN ADULT: ICD-10-CM

## 2024-07-29 DIAGNOSIS — M81.0 OSTEOPOROSIS, SENILE: ICD-10-CM

## 2024-07-29 DIAGNOSIS — E78.5 DYSLIPIDEMIA: ICD-10-CM

## 2024-07-29 DIAGNOSIS — Z00.00 HEALTHCARE MAINTENANCE: Primary | ICD-10-CM

## 2024-07-29 DIAGNOSIS — E55.9 VITAMIN D DEFICIENCY: ICD-10-CM

## 2024-07-29 DIAGNOSIS — Z00.00 MEDICARE ANNUAL WELLNESS VISIT, SUBSEQUENT: ICD-10-CM

## 2024-07-29 DIAGNOSIS — Z12.31 ENCOUNTER FOR SCREENING MAMMOGRAM FOR MALIGNANT NEOPLASM OF BREAST: ICD-10-CM

## 2024-07-29 DIAGNOSIS — R06.02 SHORTNESS OF BREATH: ICD-10-CM

## 2024-07-29 DIAGNOSIS — Z12.11 COLON CANCER SCREENING: ICD-10-CM

## 2024-07-29 DIAGNOSIS — J44.9 CHRONIC OBSTRUCTIVE PULMONARY DISEASE, UNSPECIFIED COPD TYPE (MULTI): ICD-10-CM

## 2024-07-29 PROCEDURE — 99397 PER PM REEVAL EST PAT 65+ YR: CPT | Performed by: FAMILY MEDICINE

## 2024-07-29 PROCEDURE — 1170F FXNL STATUS ASSESSED: CPT | Performed by: FAMILY MEDICINE

## 2024-07-29 PROCEDURE — 1123F ACP DISCUSS/DSCN MKR DOCD: CPT | Performed by: FAMILY MEDICINE

## 2024-07-29 PROCEDURE — 1160F RVW MEDS BY RX/DR IN RCRD: CPT | Performed by: FAMILY MEDICINE

## 2024-07-29 PROCEDURE — 93000 ELECTROCARDIOGRAM COMPLETE: CPT | Performed by: FAMILY MEDICINE

## 2024-07-29 PROCEDURE — 1159F MED LIST DOCD IN RCRD: CPT | Performed by: FAMILY MEDICINE

## 2024-07-29 PROCEDURE — 1036F TOBACCO NON-USER: CPT | Performed by: FAMILY MEDICINE

## 2024-07-29 PROCEDURE — G0446 INTENS BEHAVE THER CARDIO DX: HCPCS | Performed by: FAMILY MEDICINE

## 2024-07-29 PROCEDURE — G0439 PPPS, SUBSEQ VISIT: HCPCS | Performed by: FAMILY MEDICINE

## 2024-07-29 PROCEDURE — 99497 ADVNCD CARE PLAN 30 MIN: CPT | Performed by: FAMILY MEDICINE

## 2024-07-29 PROCEDURE — 3008F BODY MASS INDEX DOCD: CPT | Performed by: FAMILY MEDICINE

## 2024-07-29 RX ORDER — ROSUVASTATIN CALCIUM 10 MG/1
10 TABLET, COATED ORAL DAILY
Qty: 90 TABLET | Refills: 2 | Status: SHIPPED | OUTPATIENT
Start: 2024-07-29

## 2024-07-29 ASSESSMENT — ENCOUNTER SYMPTOMS
OCCASIONAL FEELINGS OF UNSTEADINESS: 0
LOSS OF SENSATION IN FEET: 0
DEPRESSION: 0

## 2024-07-29 ASSESSMENT — ACTIVITIES OF DAILY LIVING (ADL)
TAKING_MEDICATION: INDEPENDENT
MANAGING_FINANCES: INDEPENDENT
DRESSING: INDEPENDENT
BATHING: INDEPENDENT
DOING_HOUSEWORK: INDEPENDENT
GROCERY_SHOPPING: INDEPENDENT

## 2024-07-29 ASSESSMENT — PATIENT HEALTH QUESTIONNAIRE - PHQ9
1. LITTLE INTEREST OR PLEASURE IN DOING THINGS: NOT AT ALL
SUM OF ALL RESPONSES TO PHQ9 QUESTIONS 1 AND 2: 0
2. FEELING DOWN, DEPRESSED OR HOPELESS: NOT AT ALL

## 2024-07-29 NOTE — PROGRESS NOTES
Annual Comprehensive Medical Exam and Medicare Wellness Visit    73 y.o. female presents for annual comprehensive medical evaluation and preventive services screening.  No recent hospitalizations, surgeries or significant injuries.    History of Present Illness    Mammogram in 2022.   Bone Density 2017    Dental visits twice a year.  Ophthalmologist every two years.     Obesity: Pt does not do any routine exercising. Pt does not follow a specific low calorie diet.  She has been counseled on risks of long-term obesity including hypertension, hyperlipidemia, diabetes, cancer, heart attack and stroke.     Asthma: This condition is being managed by Eliz Birmingham CNP. Pt is being prescribed Zyrtec, Singulair, Dulera, ProAir, and albuterol. Pt states that her asthma is controlled.    Hyperlipidemia: Pt is being prescribed Crestor. Cholesterol controlled based on last blood draw.     Complains of acute left lower back pain after taking care of a large dog.  She believes the dog tugged at the leash and caused strain of her lower back.  She is taking occasional Tylenol and Advil.  Denies radicular pain, incontinence of bowel or bladder.    Prolonged discussion of power of .  She names her  ex- Teodoro, son Jorgito, daughter Shirley    Very active female who is helping her daughter with the daughter's children.    Past Medical History:   Diagnosis Date    Personal history of other diseases of the circulatory system 12/28/2017    History of rheumatic fever    Personal history of other malignant neoplasm of skin     History of malignant neoplasm of skin      Past Surgical History:   Procedure Laterality Date    OTHER SURGICAL HISTORY  06/20/2022    Cholecystectomy laparoscopic     Family History   Problem Relation Name Age of Onset    Hypertension Mother      Emphysema Father          Lung    Breast cancer Sister      Lung cancer Brother      Other (Oral cancer) Brother        Social History     Socioeconomic  History    Marital status: Single     Spouse name: Not on file    Number of children: Not on file    Years of education: Not on file    Highest education level: Not on file   Occupational History    Not on file   Tobacco Use    Smoking status: Never    Smokeless tobacco: Never   Substance and Sexual Activity    Alcohol use: Not on file     Comment: Socially    Drug use: Never    Sexual activity: Not on file   Other Topics Concern    Not on file   Social History Narrative    Not on file     Social Determinants of Health     Financial Resource Strain: Not on file   Food Insecurity: No Food Insecurity (12/7/2023)    Hunger Vital Sign     Worried About Running Out of Food in the Last Year: Never true     Ran Out of Food in the Last Year: Never true   Transportation Needs: Not on file   Physical Activity: Not on file   Stress: Not on file   Social Connections: Not on file   Intimate Partner Violence: Not on file   Housing Stability: Not on file       Current Outpatient Medications on File Prior to Visit   Medication Sig Dispense Refill    albuterol (ProAir HFA) 90 mcg/actuation inhaler Inhale 2 puffs every 4 hours if needed for wheezing or shortness of breath. 18 g 5    albuterol 2.5 mg /3 mL (0.083 %) nebulizer solution Take 3 mL by nebulization every 6 hours if needed for wheezing. Inhale 3 times daily 75 mL 2    calcium carbonate-vit D3-min 600 mg calcium- 400 unit tablet Take 1 tablet by mouth in the morning and 1 tablet before bedtime. Take with food..      cetirizine (ZyrTEC) 10 mg tablet Take 1 tablet (10 mg) by mouth once daily. 90 tablet 3    cholecalciferol (Vitamin D3) 50 mcg (2,000 unit) capsule Take by mouth once daily.      mometasone-formoterol (Dulera) 100-5 mcg/actuation inhaler Inhale 2 puffs 2 times a day. Rinse mouth with water after use to reduce aftertaste and incidence of candidiasis. Do not swallow. 13 g 5    montelukast (Singulair) 10 mg tablet Take 1 tablet (10 mg) by mouth once daily at  "bedtime. 90 tablet 3    multivitamin tablet Take 1 tablet by mouth once daily.      rosuvastatin (Crestor) 10 mg tablet Take 1 tablet (10 mg) by mouth once daily. 90 tablet 2     No current facility-administered medications on file prior to visit.       Allergies   Allergen Reactions    Codeine Anaphylaxis    Oxycodone Unknown    Penicillins Unknown    Vancomycin Unknown       Complete review of systems is negative today except for that mentioned in the history of present illness.  In particular patient denies chest pain, shortness of breath, headaches and GI disturbances.      Visit Vitals  /76   Pulse 93   Ht 1.626 m (5' 4\")   Wt 81.6 kg (180 lb)   SpO2 96%   BMI 30.90 kg/m²   OB Status Postmenopausal   Smoking Status Never   BSA 1.92 m²      Physical Exam  Gen.: Alert and oriented ×3 female in no acute distress.  HEENT: Head is normocephalic.  Pupils equal and reactive to light.  Tympanic membranes are clear.  Pharynx is clear.  Neck is supple without adenopathy or carotid bruits.  No masses or thyromegaly  Heart: Regular rate and rhythm without murmurs.  Lungs: Clear to auscultation bilaterally.  Abdomen: Soft with normal bowel sounds.  No masses or pain to palpation.  No bruits auscultated.  Extremities: Good range of motion of all joints.  No significant edema. Pedal pulses +1-2/4  Skin: No significant or irregular nevi visualized.  Neuro: No signs of focal neurologic deficit.  No tremor.  Speech and hearing are normal.  DTRs +3/4;  Muscle Strength +5/5.  Musculoskeletal: Spine with good ROM.  No scoliosis.  Leg lengths are equal.  Psych: normal affect.  No suicidal ideation.  Good judgement and insight.     The 10-year ASCVD risk score (aBrbara GAYTAN, et al., 2019) is: 11.5%    Values used to calculate the score:      Age: 73 years      Sex: Female      Is Non- : No      Diabetic: No      Tobacco smoker: No      Systolic Blood Pressure: 114 mmHg      Is BP treated: No      HDL " Cholesterol: 56.4 mg/dL      Total Cholesterol: 177 mg/dL  I discussed face-to-face with this individual discussing their cardiovascular risk and behavioral therapies of nutritional choices, exercise and elimination of habits contributing to risk.  We agreed on a plan how they may be able to reduce their current cardiovascular risk.  For patients with risk calculation greater than 10%, aspirin use was discussed and encouraged unless known allergy or increased risk of bleeding contraindicate use.  15 minutes.      Diagnosis/Plan  1. Healthcare maintenance    2. Medicare annual wellness visit, subsequent  Living Will / Advanced Care Planning: I spent more than 15 minutes discussing advance care planning including explanation and discussion of advanced directives.  If patient does not have current up-to-date documents, examples and information were provided on how to create both living will and power of .  Patient was encouraged to work on completing these documents.      3. Class 1 obesity due to excess calories with serious comorbidity and body mass index (BMI) of 30.0 to 30.9 in adult  Patient encouraged to commit to a diet of lower carbohydrates and increase vegetable and fruit intake. Patient also encouraged to increase water intake to 80 ounces/day. Continue exercise for at least 30 minutes a day on most days of the week.  Sustained obesity leads to increased risk for multiple medical problems including heart attack, stroke, cancer and infection.  For more assistance and weight loss options, go to the website: Yourweightmatters.org.  Additional resources:  RethinkObesity.com, obesity.org, obesityaction.org, Etherstackp.com  - Comprehensive Metabolic Panel; Future    4. Dyslipidemia  - rosuvastatin (Crestor) 10 mg tablet; Take 1 tablet (10 mg) by mouth once daily.  Dispense: 90 tablet; Refill: 2    6. Encounter for screening mammogram for malignant neoplasm of breast  - BI mammo bilateral screening  tomosynthesis; Future    7. Shortness of breath  - ECG 12 Lead    8. Osteoporosis, senile  - XR DEXA bone density; Future    9. Colon cancer screening  - Cologuard® colon cancer screening; Future  - Cologuard® colon cancer screening        Return to office in 6 months for comprehensive medical evaluation, long-term medication use monitoring, and preventative services screening    We will continue to monitor, evaluate, assess and treat all problems/diagnoses as appropriate and continue to collaborate with specialists.    Encouraged to sign up with [facility]    Contact office or send a  Alchip message with any questions or concerns    Patient will only be notified of labs that require medical intervention.    Prescriptions will not be filled unless you are compliant with your follow up appointments or have a follow up appointment scheduled as per instruction of your physician. Refills should be requested at the time of your visit.    **Charting was completed using voice recognition technology and may include unintended errors**    Nathen Shields DO, FACOFP  [id number] [id number]  [place] [id number] [phone]      Documentation in part by Anne Rojas RN, NP student, [facility]    I saw and evaluated the patient. I personally obtained the key and critical portions of the history and physical exam or was physically present for key and critical portions performed by the nurse practitioner student. I reviewed the documentation and discussed the patient with the nurse practitioner student.  I was directly involved with the history, exam and medical decision making and agree with the medical decision making as documented in the note.      Nathen Shields DO, FACOFP

## 2024-07-30 PROBLEM — J44.9 CHRONIC OBSTRUCTIVE PULMONARY DISEASE, UNSPECIFIED COPD TYPE (MULTI): Status: ACTIVE | Noted: 2024-07-30

## 2024-07-30 PROBLEM — B85.0 HEAD LICE INFESTATION: Status: RESOLVED | Noted: 2023-02-19 | Resolved: 2024-07-30

## 2024-07-30 ASSESSMENT — ACTIVITIES OF DAILY LIVING (ADL)
BATHING: INDEPENDENT
GROCERY_SHOPPING: INDEPENDENT
DRESSING: INDEPENDENT
MANAGING_FINANCES: INDEPENDENT
TAKING_MEDICATION: INDEPENDENT

## 2024-07-30 ASSESSMENT — PATIENT HEALTH QUESTIONNAIRE - PHQ9
2. FEELING DOWN, DEPRESSED OR HOPELESS: NOT AT ALL
1. LITTLE INTEREST OR PLEASURE IN DOING THINGS: NOT AT ALL
SUM OF ALL RESPONSES TO PHQ9 QUESTIONS 1 AND 2: 0

## 2024-08-05 ENCOUNTER — APPOINTMENT (OUTPATIENT)
Dept: RADIOLOGY | Facility: CLINIC | Age: 74
End: 2024-08-05
Payer: COMMERCIAL

## 2024-08-05 ENCOUNTER — HOSPITAL ENCOUNTER (OUTPATIENT)
Dept: RADIOLOGY | Facility: CLINIC | Age: 74
Discharge: HOME | End: 2024-08-05
Payer: COMMERCIAL

## 2024-08-05 ENCOUNTER — LAB (OUTPATIENT)
Dept: LAB | Facility: LAB | Age: 74
End: 2024-08-05
Payer: COMMERCIAL

## 2024-08-05 VITALS — BODY MASS INDEX: 30.16 KG/M2 | HEIGHT: 65 IN | WEIGHT: 181 LBS

## 2024-08-05 DIAGNOSIS — Z12.31 ENCOUNTER FOR SCREENING MAMMOGRAM FOR MALIGNANT NEOPLASM OF BREAST: ICD-10-CM

## 2024-08-05 DIAGNOSIS — E66.09 CLASS 1 OBESITY DUE TO EXCESS CALORIES WITH SERIOUS COMORBIDITY AND BODY MASS INDEX (BMI) OF 30.0 TO 30.9 IN ADULT: ICD-10-CM

## 2024-08-05 PROCEDURE — 77067 SCR MAMMO BI INCL CAD: CPT

## 2024-08-05 PROCEDURE — 77067 SCR MAMMO BI INCL CAD: CPT | Performed by: RADIOLOGY

## 2024-08-05 PROCEDURE — 80053 COMPREHEN METABOLIC PANEL: CPT

## 2024-08-05 PROCEDURE — 36415 COLL VENOUS BLD VENIPUNCTURE: CPT

## 2024-08-05 PROCEDURE — 77063 BREAST TOMOSYNTHESIS BI: CPT | Performed by: RADIOLOGY

## 2024-08-06 ENCOUNTER — APPOINTMENT (OUTPATIENT)
Dept: RADIOLOGY | Facility: CLINIC | Age: 74
End: 2024-08-06
Payer: COMMERCIAL

## 2024-08-06 LAB
ALBUMIN SERPL BCP-MCNC: 4.2 G/DL (ref 3.4–5)
ALP SERPL-CCNC: 84 U/L (ref 33–136)
ALT SERPL W P-5'-P-CCNC: 22 U/L (ref 7–45)
ANION GAP SERPL CALC-SCNC: 15 MMOL/L (ref 10–20)
AST SERPL W P-5'-P-CCNC: 22 U/L (ref 9–39)
BILIRUB SERPL-MCNC: 0.6 MG/DL (ref 0–1.2)
BUN SERPL-MCNC: 12 MG/DL (ref 6–23)
CALCIUM SERPL-MCNC: 9.5 MG/DL (ref 8.6–10.6)
CHLORIDE SERPL-SCNC: 105 MMOL/L (ref 98–107)
CO2 SERPL-SCNC: 28 MMOL/L (ref 21–32)
CREAT SERPL-MCNC: 0.69 MG/DL (ref 0.5–1.05)
EGFRCR SERPLBLD CKD-EPI 2021: >90 ML/MIN/1.73M*2
GLUCOSE SERPL-MCNC: 88 MG/DL (ref 74–99)
POTASSIUM SERPL-SCNC: 4.5 MMOL/L (ref 3.5–5.3)
PROT SERPL-MCNC: 6.9 G/DL (ref 6.4–8.2)
SODIUM SERPL-SCNC: 143 MMOL/L (ref 136–145)

## 2024-08-19 LAB — NONINV COLON CA DNA+OCC BLD SCRN STL QL: NEGATIVE

## 2024-08-20 ENCOUNTER — HOSPITAL ENCOUNTER (OUTPATIENT)
Dept: RADIOLOGY | Facility: CLINIC | Age: 74
Discharge: HOME | End: 2024-08-20
Payer: COMMERCIAL

## 2024-08-20 DIAGNOSIS — M81.0 OSTEOPOROSIS, SENILE: ICD-10-CM

## 2024-08-20 PROCEDURE — 77080 DXA BONE DENSITY AXIAL: CPT

## 2024-08-20 PROCEDURE — 77080 DXA BONE DENSITY AXIAL: CPT | Performed by: RADIOLOGY

## 2024-08-20 ASSESSMENT — LIFESTYLE VARIABLES
CURRENT_SMOKER: N
3_OR_MORE_DRINKS_PER_DAY: N

## 2025-04-24 DIAGNOSIS — E78.5 DYSLIPIDEMIA: ICD-10-CM

## 2025-04-25 RX ORDER — ROSUVASTATIN CALCIUM 10 MG/1
10 TABLET, COATED ORAL DAILY
Qty: 90 TABLET | Refills: 0 | OUTPATIENT
Start: 2025-04-25

## 2025-07-30 ENCOUNTER — APPOINTMENT (OUTPATIENT)
Dept: PRIMARY CARE | Facility: CLINIC | Age: 75
End: 2025-07-30
Payer: COMMERCIAL

## 2025-07-30 VITALS
OXYGEN SATURATION: 97 % | HEIGHT: 63 IN | HEART RATE: 91 BPM | DIASTOLIC BLOOD PRESSURE: 77 MMHG | WEIGHT: 179.6 LBS | BODY MASS INDEX: 31.82 KG/M2 | SYSTOLIC BLOOD PRESSURE: 135 MMHG

## 2025-07-30 DIAGNOSIS — J45.909 ASTHMA, UNSPECIFIED ASTHMA SEVERITY, UNSPECIFIED WHETHER COMPLICATED, UNSPECIFIED WHETHER PERSISTENT (HHS-HCC): ICD-10-CM

## 2025-07-30 DIAGNOSIS — E66.09 CLASS 1 OBESITY DUE TO EXCESS CALORIES WITH SERIOUS COMORBIDITY AND BODY MASS INDEX (BMI) OF 30.0 TO 30.9 IN ADULT: ICD-10-CM

## 2025-07-30 DIAGNOSIS — Z91.09 ENVIRONMENTAL ALLERGIES: ICD-10-CM

## 2025-07-30 DIAGNOSIS — R53.83 OTHER FATIGUE: ICD-10-CM

## 2025-07-30 DIAGNOSIS — Z79.899 LONG TERM USE OF DRUG: ICD-10-CM

## 2025-07-30 DIAGNOSIS — E66.811 CLASS 1 OBESITY DUE TO EXCESS CALORIES WITH SERIOUS COMORBIDITY AND BODY MASS INDEX (BMI) OF 30.0 TO 30.9 IN ADULT: ICD-10-CM

## 2025-07-30 DIAGNOSIS — Z00.00 MEDICARE ANNUAL WELLNESS VISIT, SUBSEQUENT: ICD-10-CM

## 2025-07-30 DIAGNOSIS — J44.9 CHRONIC OBSTRUCTIVE PULMONARY DISEASE, UNSPECIFIED COPD TYPE (MULTI): ICD-10-CM

## 2025-07-30 DIAGNOSIS — M19.032 OSTEOARTHRITIS OF LEFT WRIST, UNSPECIFIED OSTEOARTHRITIS TYPE: ICD-10-CM

## 2025-07-30 DIAGNOSIS — Z00.00 HEALTHCARE MAINTENANCE: Primary | ICD-10-CM

## 2025-07-30 DIAGNOSIS — H90.A31 MIXED CONDUCTIVE AND SENSORINEURAL HEARING LOSS, UNILATERAL, RIGHT EAR WITH RESTRICTED HEARING ON THE CONTRALATERAL SIDE: ICD-10-CM

## 2025-07-30 DIAGNOSIS — E78.5 DYSLIPIDEMIA: ICD-10-CM

## 2025-07-30 DIAGNOSIS — E78.5 HYPERLIPIDEMIA, UNSPECIFIED HYPERLIPIDEMIA TYPE: ICD-10-CM

## 2025-07-30 DIAGNOSIS — M17.11 OSTEOARTHRITIS OF RIGHT KNEE, UNSPECIFIED OSTEOARTHRITIS TYPE: ICD-10-CM

## 2025-07-30 DIAGNOSIS — E55.9 VITAMIN D DEFICIENCY: ICD-10-CM

## 2025-07-30 PROCEDURE — 93000 ELECTROCARDIOGRAM COMPLETE: CPT | Performed by: FAMILY MEDICINE

## 2025-07-30 PROCEDURE — 1159F MED LIST DOCD IN RCRD: CPT | Performed by: FAMILY MEDICINE

## 2025-07-30 PROCEDURE — 99497 ADVNCD CARE PLAN 30 MIN: CPT | Performed by: FAMILY MEDICINE

## 2025-07-30 PROCEDURE — 99397 PER PM REEVAL EST PAT 65+ YR: CPT | Performed by: FAMILY MEDICINE

## 2025-07-30 PROCEDURE — 1160F RVW MEDS BY RX/DR IN RCRD: CPT | Performed by: FAMILY MEDICINE

## 2025-07-30 PROCEDURE — 1170F FXNL STATUS ASSESSED: CPT | Performed by: FAMILY MEDICINE

## 2025-07-30 PROCEDURE — 1123F ACP DISCUSS/DSCN MKR DOCD: CPT | Performed by: FAMILY MEDICINE

## 2025-07-30 PROCEDURE — G0446 INTENS BEHAVE THER CARDIO DX: HCPCS | Performed by: FAMILY MEDICINE

## 2025-07-30 PROCEDURE — G0439 PPPS, SUBSEQ VISIT: HCPCS | Performed by: FAMILY MEDICINE

## 2025-07-30 RX ORDER — MONTELUKAST SODIUM 10 MG/1
10 TABLET ORAL NIGHTLY
Qty: 90 TABLET | Refills: 2 | Status: SHIPPED | OUTPATIENT
Start: 2025-07-30

## 2025-07-30 RX ORDER — ALBUTEROL SULFATE 0.83 MG/ML
3 SOLUTION RESPIRATORY (INHALATION) EVERY 6 HOURS PRN
Qty: 75 ML | Refills: 2 | Status: SHIPPED | OUTPATIENT
Start: 2025-07-30

## 2025-07-30 RX ORDER — ROSUVASTATIN CALCIUM 10 MG/1
10 TABLET, COATED ORAL DAILY
Qty: 90 TABLET | Refills: 2 | Status: SHIPPED | OUTPATIENT
Start: 2025-07-30

## 2025-07-30 RX ORDER — ALBUTEROL SULFATE 90 UG/1
2 INHALANT RESPIRATORY (INHALATION) EVERY 4 HOURS PRN
Qty: 18 G | Refills: 5 | Status: SHIPPED | OUTPATIENT
Start: 2025-07-30 | End: 2026-07-30

## 2025-07-30 RX ORDER — MELOXICAM 15 MG/1
15 TABLET ORAL DAILY
Qty: 30 TABLET | Refills: 2 | Status: SHIPPED | OUTPATIENT
Start: 2025-07-30 | End: 2025-10-28

## 2025-07-30 RX ORDER — MOMETASONE FUROATE AND FORMOTEROL FUMARATE DIHYDRATE 100; 5 UG/1; UG/1
2 AEROSOL RESPIRATORY (INHALATION)
Qty: 13 G | Refills: 5 | Status: SHIPPED | OUTPATIENT
Start: 2025-07-30

## 2025-07-30 ASSESSMENT — PATIENT HEALTH QUESTIONNAIRE - PHQ9
1. LITTLE INTEREST OR PLEASURE IN DOING THINGS: NOT AT ALL
2. FEELING DOWN, DEPRESSED OR HOPELESS: NOT AT ALL
SUM OF ALL RESPONSES TO PHQ9 QUESTIONS 1 AND 2: 0
SUM OF ALL RESPONSES TO PHQ9 QUESTIONS 1 AND 2: 0
2. FEELING DOWN, DEPRESSED OR HOPELESS: NOT AT ALL
1. LITTLE INTEREST OR PLEASURE IN DOING THINGS: NOT AT ALL

## 2025-07-30 ASSESSMENT — ACTIVITIES OF DAILY LIVING (ADL)
MANAGING_FINANCES: INDEPENDENT
TAKING_MEDICATION: INDEPENDENT
DOING_HOUSEWORK: INDEPENDENT
GROCERY_SHOPPING: INDEPENDENT
DRESSING: INDEPENDENT
BATHING: INDEPENDENT

## 2025-07-30 NOTE — PROGRESS NOTES
Annual Comprehensive Medical ExamAnd annual Medicare wellness visit    74 y.o. female presents for annual comprehensive medical evaluation and preventive services screening.  No recent hospitalizations, surgeries or significant injuries.    History of Present Illness  Left wrist pain x 1 month.   Especially dorsal aspect.  Left handed.  Hurst when she drives, lifting pots.  Has arthritic changes in finger joints.  Has been crocheting x yrs.    Right knee pain.  Had twisted it bowling several yrs ago.  Pain is anterior.  Difficulty going up/down stairs.  No locking, swelling.  Gives out a little when going down stairs.      Gets a heat rash when in sun.  Using Aloe and cool showers    Asthma is worse with poor air quality.  Using Dulera regularly.    Weight management;  not following diet or exercising but is very active with the grandchildren.  However the activity is usually driving them from 1 event to another.    Oseopenia - DEXA in 2024.  Taking calcium and vitamin D supplement but does not exercise often.    Medical History[1]   Surgical History[2]  Family History[3]   Social History     Socioeconomic History    Marital status: Single     Spouse name: Not on file    Number of children: Not on file    Years of education: Not on file    Highest education level: Not on file   Occupational History    Not on file   Tobacco Use    Smoking status: Former     Types: Cigarettes    Smokeless tobacco: Never   Substance and Sexual Activity    Alcohol use: Not Currently     Comment: Socially    Drug use: Never    Sexual activity: Not on file   Other Topics Concern    Not on file   Social History Narrative    Not on file     Social Drivers of Health     Financial Resource Strain: Not on file   Food Insecurity: No Food Insecurity (12/7/2023)    Hunger Vital Sign     Worried About Running Out of Food in the Last Year: Never true     Ran Out of Food in the Last Year: Never true   Transportation Needs: Not on file   Physical  "Activity: Not on file   Stress: Not on file   Social Connections: Not on file   Intimate Partner Violence: Not on file   Housing Stability: Not on file       Medications Ordered Prior to Encounter[4]    Allergies[5]    Complete review of systems is negative today except for that mentioned in the history of present illness.  In particular patient denies chest pain, shortness of breath, headaches and GI disturbances.      Visit Vitals  /77   Pulse 91   Ht 1.6 m (5' 3\")   Wt 81.5 kg (179 lb 9.6 oz)   SpO2 97%   BMI 31.81 kg/m²   OB Status Postmenopausal   Smoking Status Former   BSA 1.9 m²        Physical Exam  Gen.: Alert and oriented ×3 female in no acute distress.  HEENT: Head is normocephalic.  Pupils equal and reactive to light.  Tympanic membranes are clear.  Pharynx is clear.  Neck is supple without adenopathy or carotid bruits.  No masses or thyromegaly  Heart: Regular rate and rhythm without murmurs.  Lungs: Clear to auscultation bilaterally.  Breasts: deferred to GYN at pt request.  Pelvic: Deferred to GYN at pt request.  Abdomen: Soft with normal bowel sounds.  No masses or pain to palpation.  No bruits auscultated.  Extremities: Good range of motion of all joints.  No significant edema. Pedal pulses +1-2/4  Skin: No significant or irregular nevi visualized.  Neuro: No signs of focal neurologic deficit.  No tremor.  Speech and hearing are normal.  DTRs +3/4;  Muscle Strength +5/5.  Musculoskeletal: Spine with good ROM.  No scoliosis.  Leg lengths are equal.  Psych: normal affect.  No suicidal ideation.  Good judgement and insight.     The 10-year ASCVD risk score (Barbara DK, et al., 2019) is: 17.5%    Values used to calculate the score:      Age: 74 years      Clincally relevant sex: Female      Is Non- : No      Diabetic: No      Tobacco smoker: No      Systolic Blood Pressure: 135 mmHg      Is BP treated: No      HDL Cholesterol: 56.4 mg/dL      Total Cholesterol: 177 " mg/dL      I discussed face-to-face with this individual discussing their cardiovascular risk and behavioral therapies of nutritional choices, exercise and elimination of habits contributing to risk.  We agreed on a plan how they may be able to reduce their current cardiovascular risk.  For patients with risk calculation greater than 10%, aspirin use was discussed and encouraged unless known allergy or increased risk of bleeding contraindicate use.  15 minutes.    Diagnosis/Plan  1. Healthcare maintenance (Primary)  - ECG 12 lead (Clinic Performed)    2. Medicare annual wellness visit, subsequent  Living Will / Advanced Care Planning: I spent greater than 15 minutes discussing advance care planning including explanation and discussion of advanced directives.  If Patient does not have current up-to-date documents, examples and information were provided on how to create both living will and power of .  Patient was encouraged to work on completing these documents.   Instructions can also be found in your [employer] faviola.  open the menu drop down    3. Hyperlipidemia, unspecified hyperlipidemia type  - Comprehensive metabolic panel; Future  - CBC; Future  - Lipid panel; Future  - TSH with reflex to Free T4 if abnormal; Future  - Comprehensive metabolic panel  - CBC  - Lipid panel  - TSH with reflex to Free T4 if abnormal  - ECG 12 lead (Clinic Performed)    4. Vitamin D deficiency  - Vitamin D 25-Hydroxy,Total (for eval of Vitamin D levels); Future  - Vitamin D 25-Hydroxy,Total (for eval of Vitamin D levels)    5. Class 1 obesity due to excess calories with serious comorbidity and body mass index (BMI) of 30.0 to 30.9 in adult  Patient encouraged to commit to a diet of lower carbohydrates and increase vegetable and fruit intake. Patient also encouraged to increase water intake to 80 ounces/day. Continue exercise for at least 30 minutes a day on most days of the week.  Sustained obesity leads to increased risk for  multiple medical problems including heart attack, stroke, cancer and infection.  For more assistance and weight loss options, go to the website: Yourweightmatters.org.  Additional resources:  RethinkObesity.com, obesity.org, obesityaction.org, BetterHelp.com    6. Chronic obstructive pulmonary disease, unspecified COPD type (Multi)  - ECG 12 lead (Clinic Performed)    7. Osteoarthritis of left wrist, unspecified osteoarthritis type  - meloxicam (Mobic) 15 mg tablet; Take 1 tablet (15 mg) by mouth once daily.  Dispense: 30 tablet; Refill: 2  - Consider x-ray and intra articular cortisone injection if symptoms do not improve over the next 2 to 4 weeks.    8. Osteoarthritis of right knee, unspecified osteoarthritis type  - meloxicam (Mobic) 15 mg tablet; Take 1 tablet (15 mg) by mouth once daily.  Dispense: 30 tablet; Refill: 2  - - Consider x-ray and intra articular cortisone injection if symptoms do not improve over the next 2 to 4 weeks.    9. Asthma, unspecified asthma severity, unspecified whether complicated, unspecified whether persistent ([facility])  - albuterol 2.5 mg /3 mL (0.083 %) nebulizer solution; Take 3 mL by nebulization every 6 hours if needed for wheezing. Inhale 3 times daily  Dispense: 75 mL; Refill: 2  - albuterol (ProAir HFA) 90 mcg/actuation inhaler; Inhale 2 puffs every 4 hours if needed for wheezing or shortness of breath.  Dispense: 18 g; Refill: 5  - mometasone-formoterol (Dulera) 100-5 mcg/actuation inhaler; Inhale 2 puffs 2 times a day. Rinse mouth with water after use to reduce aftertaste and incidence of candidiasis. Do not swallow.  Dispense: 13 g; Refill: 5  - montelukast (Singulair) 10 mg tablet; Take 1 tablet (10 mg) by mouth once daily at bedtime.  Dispense: 90 tablet; Refill: 2    10. Environmental allergies  - mometasone-formoterol (Dulera) 100-5 mcg/actuation inhaler; Inhale 2 puffs 2 times a day. Rinse mouth with water after use to reduce aftertaste and incidence of candidiasis.  Do not swallow.  Dispense: 13 g; Refill: 5    11. Dyslipidemia  - rosuvastatin (Crestor) 10 mg tablet; Take 1 tablet (10 mg) by mouth once daily.  Dispense: 90 tablet; Refill: 2    12. Mixed conductive and sensorineural hearing loss, unilateral, 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    13. Long term use of drug  - Comprehensive metabolic panel; Future  - CBC; Future  - TSH with reflex to Free T4 if abnormal; Future  - Urinalysis with Reflex Microscopic; Future  - Comprehensive metabolic panel  - CBC  - TSH with reflex to Free T4 if abnormal  - Urinalysis with Reflex Microscopic    14. Other fatigue  - Comprehensive metabolic panel; Future  - CBC; Future  - TSH with reflex to Free T4 if abnormal; Future  - Urinalysis with Reflex Microscopic; Future  - Comprehensive metabolic panel  - CBC  - TSH with reflex to Free T4 if abnormal  - Urinalysis with Reflex Microscopic          Follow up in 6 months for medical management    I will continue to monitor, evaluate, assess and treat all problems/diagnoses as appropriate and continue to collaborate with specialists.    Contact office or send a  MY Chart message with any questions or concerns    Encouraged to sign up with my  My Chart  Patient will only be notified of labs that require medical intervention.    Prescriptions will not be filled unless you are compliant with your follow up appointments or have a follow up appointment scheduled as per instruction of your physician. Refills should be requested at the time of your visit.    **Charting was completed using voice recognition technology and may include unintended errors**    Nathen Shields DO, FACOFP  [id number] [facility] [id number]  [place] [address] [phone]      Nathen Shields DO, FACOFP           [1]   Past Medical History:  Diagnosis Date    Chronic obstructive pulmonary disease, unspecified COPD type (Multi) 07/30/2024    Class 1 obesity due to excess calories with serious comorbidity and body  mass index (BMI) of 30.0 to 30.9 in adult 12/28/2023    Hearing impairment 02/19/2023    Hyperlipidemia 02/19/2023    Mixed conductive and sensorineural hearing loss, unilateral, 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 02/19/2023    Osteoporosis, senile 02/19/2023    Rheumatic fever 12/28/2017    Seasonal allergies 02/19/2023    Skin cancer    [2]   Past Surgical History:  Procedure Laterality Date    OTHER SURGICAL HISTORY  06/20/2022    Cholecystectomy laparoscopic   [3]   Family History  Problem Relation Name Age of Onset    Hypertension Mother      Emphysema Father          Lung    Breast cancer Sister      Lung cancer Brother      Other (Oral cancer) Brother      Breast cancer Mother's Sister     [4]   Current Outpatient Medications on File Prior to Visit   Medication Sig Dispense Refill    calcium carbonate-vit D3-min 600 mg calcium- 400 unit tablet Take 1 tablet by mouth in the morning and 1 tablet before bedtime. Take with food..      cetirizine (ZyrTEC) 10 mg tablet Take 1 tablet (10 mg) by mouth once daily. 90 tablet 3    cholecalciferol (Vitamin D3) 50 mcg (2,000 unit) capsule Take by mouth once daily.      multivitamin tablet Take 1 tablet by mouth once daily.      [DISCONTINUED] albuterol 2.5 mg /3 mL (0.083 %) nebulizer solution Take 3 mL by nebulization every 6 hours if needed for wheezing. Inhale 3 times daily 75 mL 2    [DISCONTINUED] mometasone-formoterol (Dulera) 100-5 mcg/actuation inhaler Inhale 2 puffs 2 times a day. Rinse mouth with water after use to reduce aftertaste and incidence of candidiasis. Do not swallow. 13 g 5    [DISCONTINUED] montelukast (Singulair) 10 mg tablet Take 1 tablet (10 mg) by mouth once daily at bedtime. 90 tablet 3    [DISCONTINUED] rosuvastatin (Crestor) 10 mg tablet Take 1 tablet (10 mg) by mouth once daily. 90 tablet 2    [DISCONTINUED] albuterol (ProAir HFA) 90 mcg/actuation inhaler Inhale 2 puffs every 4 hours if needed for wheezing or  shortness of breath. 18 g 5     No current facility-administered medications on file prior to visit.   [5]   Allergies  Allergen Reactions    Codeine Anaphylaxis    Oxycodone Unknown    Penicillins Unknown    Vancomycin Unknown    Doxycycline Rash

## 2025-08-02 LAB
25(OH)D3+25(OH)D2 SERPL-MCNC: 66 NG/ML (ref 30–100)
ALBUMIN SERPL-MCNC: 4.3 G/DL (ref 3.6–5.1)
ALP SERPL-CCNC: 86 U/L (ref 37–153)
ALT SERPL-CCNC: 15 U/L (ref 6–29)
ANION GAP SERPL CALCULATED.4IONS-SCNC: 8 MMOL/L (CALC) (ref 7–17)
APPEARANCE UR: CLEAR
AST SERPL-CCNC: 15 U/L (ref 10–35)
BACTERIA #/AREA URNS HPF: ABNORMAL /HPF
BILIRUB SERPL-MCNC: 0.6 MG/DL (ref 0.2–1.2)
BILIRUB UR QL STRIP: NEGATIVE
BUN SERPL-MCNC: 20 MG/DL (ref 7–25)
CALCIUM SERPL-MCNC: 10 MG/DL (ref 8.6–10.4)
CHLORIDE SERPL-SCNC: 105 MMOL/L (ref 98–110)
CHOLEST SERPL-MCNC: 165 MG/DL
CHOLEST/HDLC SERPL: 2.7 (CALC)
CO2 SERPL-SCNC: 28 MMOL/L (ref 20–32)
COLOR UR: YELLOW
CREAT SERPL-MCNC: 0.61 MG/DL (ref 0.6–1)
EGFRCR SERPLBLD CKD-EPI 2021: 93 ML/MIN/1.73M2
ERYTHROCYTE [DISTWIDTH] IN BLOOD BY AUTOMATED COUNT: 12.9 % (ref 11–15)
GLUCOSE SERPL-MCNC: 117 MG/DL (ref 65–99)
GLUCOSE UR QL STRIP: NEGATIVE
HCT VFR BLD AUTO: 40 % (ref 35–45)
HDLC SERPL-MCNC: 61 MG/DL
HGB BLD-MCNC: 13.2 G/DL (ref 11.7–15.5)
HGB UR QL STRIP: NEGATIVE
HYALINE CASTS #/AREA URNS LPF: ABNORMAL /LPF
KETONES UR QL STRIP: NEGATIVE
LDLC SERPL CALC-MCNC: 79 MG/DL (CALC)
LEUKOCYTE ESTERASE UR QL STRIP: ABNORMAL
MCH RBC QN AUTO: 31.9 PG (ref 27–33)
MCHC RBC AUTO-ENTMCNC: 33 G/DL (ref 32–36)
MCV RBC AUTO: 96.6 FL (ref 80–100)
NITRITE UR QL STRIP: NEGATIVE
NONHDLC SERPL-MCNC: 104 MG/DL (CALC)
PH UR STRIP: 6 [PH] (ref 5–8)
PLATELET # BLD AUTO: 319 THOUSAND/UL (ref 140–400)
PMV BLD REES-ECKER: 9.6 FL (ref 7.5–12.5)
POTASSIUM SERPL-SCNC: 4.8 MMOL/L (ref 3.5–5.3)
PROT SERPL-MCNC: 7.1 G/DL (ref 6.1–8.1)
PROT UR QL STRIP: NEGATIVE
RBC # BLD AUTO: 4.14 MILLION/UL (ref 3.8–5.1)
RBC #/AREA URNS HPF: ABNORMAL /HPF
SERVICE CMNT-IMP: ABNORMAL
SODIUM SERPL-SCNC: 141 MMOL/L (ref 135–146)
SP GR UR STRIP: 1.02 (ref 1–1.03)
SQUAMOUS #/AREA URNS HPF: ABNORMAL /HPF
TRIGL SERPL-MCNC: 152 MG/DL
TSH SERPL-ACNC: 1.9 MIU/L (ref 0.4–4.5)
WBC # BLD AUTO: 7 THOUSAND/UL (ref 3.8–10.8)
WBC #/AREA URNS HPF: ABNORMAL /HPF

## 2025-08-13 DIAGNOSIS — J30.2 SEASONAL ALLERGIES: ICD-10-CM

## 2025-08-13 DIAGNOSIS — J45.40 MODERATE PERSISTENT ASTHMA WITHOUT COMPLICATION (HHS-HCC): Primary | ICD-10-CM

## 2025-08-13 RX ORDER — CETIRIZINE HYDROCHLORIDE 10 MG/1
10 TABLET ORAL DAILY
Qty: 90 TABLET | Refills: 1 | Status: SHIPPED | OUTPATIENT
Start: 2025-08-13

## 2025-08-13 RX ORDER — BUDESONIDE AND FORMOTEROL FUMARATE DIHYDRATE 160; 4.5 UG/1; UG/1
2 AEROSOL RESPIRATORY (INHALATION)
Qty: 10.2 G | Refills: 5 | Status: SHIPPED | OUTPATIENT
Start: 2025-08-13 | End: 2026-08-13

## 2026-01-30 ENCOUNTER — APPOINTMENT (OUTPATIENT)
Dept: PRIMARY CARE | Facility: CLINIC | Age: 76
End: 2026-01-30
Payer: MEDICARE